# Patient Record
Sex: FEMALE | Race: WHITE | Employment: FULL TIME | URBAN - METROPOLITAN AREA
[De-identification: names, ages, dates, MRNs, and addresses within clinical notes are randomized per-mention and may not be internally consistent; named-entity substitution may affect disease eponyms.]

---

## 2017-02-21 ENCOUNTER — OFFICE VISIT (OUTPATIENT)
Dept: INTERNAL MEDICINE CLINIC | Age: 37
End: 2017-02-21

## 2017-02-21 VITALS
SYSTOLIC BLOOD PRESSURE: 113 MMHG | DIASTOLIC BLOOD PRESSURE: 69 MMHG | TEMPERATURE: 98.2 F | HEIGHT: 62 IN | BODY MASS INDEX: 23.74 KG/M2 | WEIGHT: 129 LBS | OXYGEN SATURATION: 99 % | HEART RATE: 81 BPM | RESPIRATION RATE: 16 BRPM

## 2017-02-21 DIAGNOSIS — R05.9 COUGH: Primary | ICD-10-CM

## 2017-02-21 DIAGNOSIS — R52 BODY ACHES: ICD-10-CM

## 2017-02-21 DIAGNOSIS — J34.89 FRONTAL SINUS PAIN: ICD-10-CM

## 2017-02-21 RX ORDER — NORETHINDRONE ACETATE AND ETHINYL ESTRADIOL AND FERROUS FUMARATE 1MG-20(21)
KIT ORAL
Refills: 0 | COMMUNITY
Start: 2017-02-11

## 2017-02-21 RX ORDER — AMOXICILLIN AND CLAVULANATE POTASSIUM 875; 125 MG/1; MG/1
1 TABLET, FILM COATED ORAL EVERY 12 HOURS
Qty: 10 TAB | Refills: 0 | Status: SHIPPED | OUTPATIENT
Start: 2017-02-21 | End: 2017-02-26

## 2017-02-21 RX ORDER — CODEINE PHOSPHATE AND GUAIFENESIN 10; 100 MG/5ML; MG/5ML
5 SOLUTION ORAL
Qty: 118 ML | Refills: 0 | Status: SHIPPED | OUTPATIENT
Start: 2017-02-21 | End: 2017-02-28

## 2017-02-21 NOTE — PROGRESS NOTES
HISTORY OF PRESENT ILLNESS  Tawanda Mcmahan is a 43 y.o. female. Cold Symptoms   The history is provided by the patient. This is a new problem. The current episode started more than 1 week ago. The problem occurs constantly. The problem has been gradually worsening. The cough is productive of sputum. There has been a fever of 100 - 100.9 F. The fever has been present for 1 - 2 days. Associated symptoms include chills, ear congestion, ear pain, headaches, rhinorrhea and sore throat. Pertinent negatives include no chest pain, no myalgias, no wheezing, no nausea and no vomiting. She has tried cough syrup for the symptoms. The treatment provided no relief. She is not a smoker. Her past medical history does not include bronchitis or pneumonia. Review of Systems   Constitutional: Positive for chills and malaise/fatigue. Negative for fever. HENT: Positive for congestion, ear pain, rhinorrhea and sore throat. Eyes: Negative for blurred vision and double vision. Respiratory: Positive for cough and sputum production. Negative for wheezing. Cardiovascular: Negative for chest pain and palpitations. Gastrointestinal: Negative for nausea and vomiting. Genitourinary: Negative for dysuria, frequency and urgency. Musculoskeletal: Negative for myalgias. Neurological: Positive for headaches. Negative for dizziness and tingling. Psychiatric/Behavioral: Negative for depression. The patient is not nervous/anxious. Physical Exam   Constitutional: She is oriented to person, place, and time. She appears well-developed and well-nourished. No distress. HENT:   Head: Normocephalic and atraumatic. Right Ear: There is tenderness. Tympanic membrane is erythematous. Left Ear: There is tenderness. Tympanic membrane is erythematous. Nose: Mucosal edema and rhinorrhea present. Right sinus exhibits maxillary sinus tenderness and frontal sinus tenderness.  Left sinus exhibits maxillary sinus tenderness and frontal sinus tenderness. Mouth/Throat: Mucous membranes are normal. Oropharyngeal exudate, posterior oropharyngeal edema, posterior oropharyngeal erythema and tonsillar abscesses present. Eyes: EOM are normal. Pupils are equal, round, and reactive to light. Neck: Neck supple. No thyromegaly present. Cardiovascular: Regular rhythm. Pulmonary/Chest: Breath sounds normal. She has no wheezes. Lymphadenopathy:     She has no cervical adenopathy. Neurological: She is alert and oriented to person, place, and time. No cranial nerve deficit. Skin: Skin is warm and dry. She is not diaphoretic. No erythema. Psychiatric: She has a normal mood and affect. Nursing note and vitals reviewed. ASSESSMENT and PLAN    ICD-10-CM ICD-9-CM    1. Cough R05 786.2 amoxicillin-clavulanate (AUGMENTIN) 875-125 mg per tablet      guaiFENesin-codeine (CHERATUSSIN AC) 100-10 mg/5 mL solution   2. Frontal sinus pain J34.89 478.19 amoxicillin-clavulanate (AUGMENTIN) 875-125 mg per tablet   3. Body aches R52 780.96 amoxicillin-clavulanate (AUGMENTIN) 875-125 mg per tablet   Patient advised to take medication as prescribed. Take rest and plenty of fluids. Alternate tylenol and ibuprofen for fever. Return to clinic if condition worsens in next 72 hours. Patient verbalized the understanding.

## 2017-02-21 NOTE — PATIENT INSTRUCTIONS
Cough: Care Instructions  Your Care Instructions  A cough is your body's response to something that bothers your throat or airways. Many things can cause a cough. You might cough because of a cold or the flu, bronchitis, or asthma. Smoking, postnasal drip, allergies, and stomach acid that backs up into your throat also can cause coughs. A cough is a symptom, not a disease. Most coughs stop when the cause, such as a cold, goes away. You can take a few steps at home to cough less and feel better. Follow-up care is a key part of your treatment and safety. Be sure to make and go to all appointments, and call your doctor if you are having problems. It's also a good idea to know your test results and keep a list of the medicines you take. How can you care for yourself at home? · Drink lots of water and other fluids. This helps thin the mucus and soothes a dry or sore throat. Honey or lemon juice in hot water or tea may ease a dry cough. · Take cough medicine as directed by your doctor. · Prop up your head on pillows to help you breathe and ease a dry cough. · Try cough drops to soothe a dry or sore throat. Cough drops don't stop a cough. Medicine-flavored cough drops are no better than candy-flavored drops or hard candy. · Do not smoke. Avoid secondhand smoke. If you need help quitting, talk to your doctor about stop-smoking programs and medicines. These can increase your chances of quitting for good. When should you call for help? Call 911 anytime you think you may need emergency care. For example, call if:  · You have severe trouble breathing. Call your doctor now or seek immediate medical care if:  · You cough up blood. · You have new or worse trouble breathing. · You have a new or higher fever. · You have a new rash.   Watch closely for changes in your health, and be sure to contact your doctor if:  · You cough more deeply or more often, especially if you notice more mucus or a change in the color of your mucus. · You have new symptoms, such as a sore throat, an earache, or sinus pain. · You do not get better as expected. Where can you learn more? Go to http://lebron-hillary.info/. Enter D279 in the search box to learn more about \"Cough: Care Instructions. \"  Current as of: May 27, 2016  Content Version: 11.1  © 0979-0169 [employer]. Care instructions adapted under license by [employer] (which disclaims liability or warranty for this information). If you have questions about a medical condition or this instruction, always ask your healthcare professional. [address] any warranty or liability for your use of this information.

## 2017-02-21 NOTE — MR AVS SNAPSHOT
Visit Information Date & Time Provider Department Dept. Phone Encounter #  
 2/21/2017  9:00 AM Earnest Kruger NP [facility] [phone] [id number] Upcoming Health Maintenance Date Due INFLUENZA AGE 9 TO ADULT 8/1/2016 PAP AKA CERVICAL CYTOLOGY 2/4/2017 DTaP/Tdap/Td series (2 - Td) 9/18/2025 Allergies as of 2/21/2017  Review Complete On: 2/21/2017 By: Jose Alberto Ballesteros LPN Severity Noted Reaction Type Reactions Macrobid [Nitrofurantoin Monohyd/m-cryst]  09/07/2012   Intolerance Shortness of Breath Current Immunizations  Reviewed on 10/2/2015 Name Date DTaP 9/18/2015 Influenza Vaccine (Quad) PF 10/1/2015 11:36 AM  
 MMR 10/1/2015 12:55 PM  
  
 Not reviewed this visit You Were Diagnosed With   
  
 Codes Comments Cough    -  Primary ICD-10-CM: G62 ICD-9-CM: 786.2 Frontal sinus pain     ICD-10-CM: J34.89 ICD-9-CM: 478.19 Body aches     ICD-10-CM: R52 ICD-9-CM: 780.96 Vitals BP Pulse Temp Resp Height(growth percentile) Weight(growth percentile) 113/69 (BP 1 Location: Right arm, BP Patient Position: Sitting) 81 98.2 °F (36.8 °C) (Oral) 16 5' 2\" (1.575 m) 129 lb (58.5 kg) LMP SpO2 BMI OB Status Smoking Status 01/07/2017 (Approximate) 99% 23.59 kg/m2 Recent pregnancy Never Smoker Vitals History BMI and BSA Data Body Mass Index Body Surface Area  
 23.59 kg/m 2 1.6 m 2 Preferred Pharmacy Pharmacy Name Phone [facility] [address] [phone] Your Updated Medication List  
  
   
This list is accurate as of: 2/21/17  9:24 AM.  Always use your most recent med list.  
  
  
  
  
 amoxicillin-clavulanate 875-125 mg per tablet Commonly known as:  AUGMENTIN Take 1 Tab by mouth every twelve (12) hours for 5 days. guaiFENesin-codeine 100-10 mg/5 mL solution Commonly known as:  [place]  
 Take 5 mL by mouth three (3) times daily as needed for Cough for up to 7 days. Max Daily Amount: 15 mL. ibuprofen 800 mg tablet Commonly known as:  MOTRIN Take 1 Tab by mouth every six (6) hours as needed. JUNEL FE 1/20 (28) 1 mg-20 mcg (21)/75 mg (7) Tab Generic drug:  norethindrone-ethinyl estradiol PNV with Ca No.65-Iron Poly-FA 60 mg iron-1 mg Cap Take  by mouth. ZOLOFT 100 mg tablet Generic drug:  sertraline Take 200 mg by mouth daily. Prescriptions Printed Refills  
 guaiFENesin-codeine (CHERATUSSIN AC) 100-10 mg/5 mL solution 0 Sig: Take 5 mL by mouth three (3) times daily as needed for Cough for up to 7 days. Max Daily Amount: 15 mL. Class: Print Route: Oral  
  
Prescriptions Sent to Pharmacy Refills  
 amoxicillin-clavulanate (AUGMENTIN) 875-125 mg per tablet 0 Sig: Take 1 Tab by mouth every twelve (12) hours for 5 days. Class: Normal  
 Pharmacy: [id number] [address] [place] [address] Ph #: [phone] Route: Oral  
  
Patient Instructions Cough: Care Instructions Your Care Instructions A cough is your body's response to something that bothers your throat or airways. Many things can cause a cough. You might cough because of a cold or the flu, bronchitis, or asthma. Smoking, postnasal drip, allergies, and stomach acid that backs up into your throat also can cause coughs. A cough is a symptom, not a disease. Most coughs stop when the cause, such as a cold, goes away. You can take a few steps at home to cough less and feel better. Follow-up care is a key part of your treatment and safety. Be sure to make and go to all appointments, and call your doctor if you are having problems. It's also a good idea to know your test results and keep a list of the medicines you take. How can you care for yourself at home? · Drink lots of water and other fluids.  This helps thin the mucus and soothes a dry or sore throat. Honey or lemon juice in hot water or tea may ease a dry cough. · Take cough medicine as directed by your doctor. · Prop up your head on pillows to help you breathe and ease a dry cough. · Try cough drops to soothe a dry or sore throat. Cough drops don't stop a cough. Medicine-flavored cough drops are no better than candy-flavored drops or hard candy. · Do not smoke. Avoid secondhand smoke. If you need help quitting, talk to your doctor about stop-smoking programs and medicines. These can increase your chances of quitting for good. When should you call for help? Call 911 anytime you think you may need emergency care. For example, call if: 
· You have severe trouble breathing. Call your doctor now or seek immediate medical care if: 
· You cough up blood. · You have new or worse trouble breathing. · You have a new or higher fever. · You have a new rash. Watch closely for changes in your health, and be sure to contact your doctor if: 
· You cough more deeply or more often, especially if you notice more mucus or a change in the color of your mucus. · You have new symptoms, such as a sore throat, an earache, or sinus pain. · You do not get better as expected. Where can you learn more? Go to http://lebron-hillary.info/. Enter D279 in the search box to learn more about \"Cough: Care Instructions. \" Current as of: May 27, 2016 Content Version: 11.1 © 1798-2830 [employer]. Care instructions adapted under license by [employer] (which disclaims liability or warranty for this information). If you have questions about a medical condition or this instruction, always ask your healthcare professional. [place] [address] any warranty or liability for your use of this information. Introducing [facility]!    
 Veronica Montgomeyr introduces [employer] patient portal. Now you can access parts of your medical record, email your doctor's office, and request medication refills online. 1. In your internet browser, go to [email or web address] 2. Click on the First Time User? Click Here link in the Sign In box. You will see the New Member Sign Up page. 3. Enter your Casenet Access Code exactly as it appears below. You will not need to use this code after youve completed the sign-up process. If you do not sign up before the expiration date, you must request a new code. · [employer]et Access Code: [email or web address] Expires: 5/22/2017  9:24 AM 
 
4. Enter the last four digits of your Social Security Number (xxxx) and Date of Birth (mm/dd/yyyy) as indicated and click Submit. You will be taken to the next sign-up page. 5. Create a Casenet ID. This will be your Casenet login ID and cannot be changed, so think of one that is secure and easy to remember. 6. Create a Casenet password. You can change your password at any time. 7. Enter your Password Reset Question and Answer. This can be used at a later time if you forget your password. 8. Enter your e-mail address. You will receive e-mail notification when new information is available in [address] 9. Click Sign Up. You can now view and download portions of your medical record. 10. Click the Download Summary menu link to download a portable copy of your medical information. If you have questions, please visit the Frequently Asked Questions section of the [employer]et website. Remember, [employer]et is NOT to be used for urgent needs. For medical emergencies, dial 911. Now available from your iPhone and Android! Please provide this summary of care documentation to your next provider. Your primary care clinician is listed as Luther Warner. If you have any questions after today's visit, please call [phone].

## 2017-02-21 NOTE — PROGRESS NOTES
Pt presented today with cold symptoms x 3 days. Has pt had any falls since last visit? No.  Pt preferred language for health care discussion is english. Advanced Directive? No    Is someone accompanying this pt? No    Is the patient using any DME equipment during OV? No      1. Have you been to the ER, urgent care clinic since your last visit? Hospitalized since your last visit? No    2. Have you seen or consulted any other health care providers outside of the [address] [place] [address] since your last visit? Include any pap smears or colon screening. No      Pt has a reminder for a \"due or due soon\" health maintenance. I have asked that she contact his primary care provider for follow-up on this health maintenance.

## 2017-03-21 ENCOUNTER — OFFICE VISIT (OUTPATIENT)
Dept: INTERNAL MEDICINE CLINIC | Age: 37
End: 2017-03-21

## 2017-03-21 VITALS
WEIGHT: 129 LBS | HEART RATE: 76 BPM | BODY MASS INDEX: 23.74 KG/M2 | DIASTOLIC BLOOD PRESSURE: 69 MMHG | TEMPERATURE: 99.2 F | HEIGHT: 62 IN | OXYGEN SATURATION: 99 % | SYSTOLIC BLOOD PRESSURE: 116 MMHG | RESPIRATION RATE: 16 BRPM

## 2017-03-21 DIAGNOSIS — R52 BODY ACHES: Primary | ICD-10-CM

## 2017-03-21 DIAGNOSIS — R50.9 FEVER, UNSPECIFIED FEVER CAUSE: ICD-10-CM

## 2017-03-21 DIAGNOSIS — R05.9 COUGH: ICD-10-CM

## 2017-03-21 RX ORDER — LORATADINE 10 MG/1
10 TABLET ORAL DAILY
Qty: 30 TAB | Refills: 0 | Status: SHIPPED | OUTPATIENT
Start: 2017-03-21 | End: 2017-04-20

## 2017-03-21 RX ORDER — OSELTAMIVIR PHOSPHATE 75 MG/1
75 CAPSULE ORAL 2 TIMES DAILY
Qty: 10 CAP | Refills: 0 | Status: SHIPPED | OUTPATIENT
Start: 2017-03-21 | End: 2017-03-26

## 2017-03-21 RX ORDER — FLUCONAZOLE 150 MG/1
TABLET ORAL
Refills: 0 | COMMUNITY
Start: 2017-03-10 | End: 2018-03-22 | Stop reason: ALTCHOICE

## 2017-03-21 NOTE — PROGRESS NOTES
HISTORY OF PRESENT ILLNESS  Donald Jerome is a 43 y.o. female. Generalized Body Aches   The history is provided by the patient. This is a new problem. The current episode started more than 2 days ago. The problem occurs constantly. The problem has been gradually worsening. Associated symptoms include headaches. Pertinent negatives include no chest pain, no abdominal pain and no shortness of breath. Associated symptoms comments: Fever, maliase, fatigue. Nothing aggravates the symptoms. Nothing relieves the symptoms. She has tried nothing for the symptoms. The treatment provided no relief. Review of Systems   Constitutional: Positive for chills, fever and malaise/fatigue. HENT: Positive for congestion. Negative for ear pain and sore throat. Eyes: Negative for blurred vision, double vision and photophobia. Respiratory: Positive for cough and sputum production. Negative for shortness of breath and wheezing. Cardiovascular: Negative for chest pain and palpitations. Gastrointestinal: Negative for abdominal pain, heartburn, nausea and vomiting. Genitourinary: Negative for dysuria and urgency. Musculoskeletal: Positive for myalgias and neck pain. Negative for back pain, falls and joint pain. Skin: Negative for rash. Neurological: Positive for weakness and headaches. Negative for dizziness and tingling. Endo/Heme/Allergies: Negative for environmental allergies and polydipsia. Psychiatric/Behavioral: The patient is not nervous/anxious. Physical Exam   Constitutional: She is oriented to person, place, and time. She appears well-developed and well-nourished. No distress. HENT:   Head: Normocephalic and atraumatic. Right Ear: External ear normal. No middle ear effusion. Left Ear:  No middle ear effusion. Mouth/Throat: No oropharyngeal exudate. Eyes: EOM are normal. Pupils are equal, round, and reactive to light. Neck: Neck supple. No thyromegaly present.    Cardiovascular: Regular rhythm. Pulmonary/Chest: Breath sounds normal. No respiratory distress. She has no wheezes. Lymphadenopathy:     She has no cervical adenopathy. Neurological: She is alert and oriented to person, place, and time. No cranial nerve deficit. Skin: She is not diaphoretic. Psychiatric: She has a normal mood and affect. Nursing note and vitals reviewed. ASSESSMENT and PLAN    ICD-10-CM ICD-9-CM    1. Body aches R52 780.96 oseltamivir (TAMIFLU) 75 mg capsule   2. Cough R05 786.2 loratadine (CLARITIN) 10 mg tablet   3. Fever, unspecified fever cause R50.9 780.60 oseltamivir (TAMIFLU) 75 mg capsule   Patient advised to take medication as prescribed. Take rest and plenty of fluids. Alternate tylenol and ibuprofen for fever. Return to clinic if condition worsens in next 72 hours.

## 2017-03-21 NOTE — MR AVS SNAPSHOT
Visit Information Date & Time Provider Department Dept. Phone Encounter #  
 3/21/2017  4:00 PM [facility] NP Shira Spears 139 [id number] Upcoming Health Maintenance Date Due INFLUENZA AGE 9 TO ADULT 8/1/2016 PAP AKA CERVICAL CYTOLOGY 2/4/2017 DTaP/Tdap/Td series (2 - Td) 9/18/2025 Allergies as of 3/21/2017  Review Complete On: 3/21/2017 By: Viktoriya Theodore LPN Severity Noted Reaction Type Reactions Macrobid [Nitrofurantoin Monohyd/m-cryst]  09/07/2012   Intolerance Shortness of Breath Current Immunizations  Reviewed on 10/2/2015 Name Date DTaP 9/18/2015 Influenza Vaccine (Quad) PF 10/1/2015 11:36 AM  
 MMR 10/1/2015 12:55 PM  
  
 Not reviewed this visit You Were Diagnosed With   
  
 Codes Comments Body aches    -  Primary ICD-10-CM: D27 ICD-9-CM: 780.96 Cough     ICD-10-CM: R05 ICD-9-CM: 786.2 Fever, unspecified fever cause     ICD-10-CM: R50.9 ICD-9-CM: 780.60 Vitals BP Pulse Temp Resp Height(growth percentile) Weight(growth percentile) 116/69 (BP 1 Location: Right arm, BP Patient Position: Sitting) 76 99.2 °F (37.3 °C) (Oral) 16 5' 2\" (1.575 m) 129 lb (58.5 kg) LMP SpO2 Breastfeeding? BMI OB Status Smoking Status 02/07/2017 (Exact Date) 99% No 23.59 kg/m2 Recent pregnancy Never Smoker Vitals History BMI and BSA Data Body Mass Index Body Surface Area  
 23.59 kg/m 2 1.6 m 2 Preferred Pharmacy Pharmacy Name Phone [facility], [address] [place] [address] Drive [phone] Your Updated Medication List  
  
   
This list is accurate as of: 3/21/17  4:29 PM.  Always use your most recent med list.  
  
  
  
  
 fluconazole 150 mg tablet Commonly known as:  DIFLUCAN  
take 1 tablet by mouth REPEAT IN 72 HOURS IF NEEDED  
  
 ibuprofen 800 mg tablet Commonly known as:  MOTRIN Take 1 Tab by mouth every six (6) hours as needed. JUNEL FE 1/20 (28) 1 mg-20 mcg (21)/75 mg (7) Tab Generic drug:  norethindrone-ethinyl estradiol  
  
 loratadine 10 mg tablet Commonly known as:  Kathlyne Old Take 1 Tab by mouth daily for 30 days. oseltamivir 75 mg capsule Commonly known as:  TAMIFLU Take 1 Cap by mouth two (2) times a day for 5 days. PNV with Ca No.65-Iron Poly-FA 60 mg iron-1 mg Cap Take  by mouth. ZOLOFT 100 mg tablet Generic drug:  sertraline Take 200 mg by mouth daily. Prescriptions Sent to Pharmacy Refills  
 oseltamivir (TAMIFLU) 75 mg capsule 0 Sig: Take 1 Cap by mouth two (2) times a day for 5 days. Class: Normal  
 Pharmacy: [id number] [address] [place] [address] Ph #: [phone] Route: Oral  
 loratadine (CLARITIN) 10 mg tablet 0 Sig: Take 1 Tab by mouth daily for 30 days. Class: Normal  
 Pharmacy: [id number] [address] [place] [address] Ph #: [phone] Route: Oral  
  
Patient Instructions Cough: Care Instructions Your Care Instructions A cough is your body's response to something that bothers your throat or airways. Many things can cause a cough. You might cough because of a cold or the flu, bronchitis, or asthma. Smoking, postnasal drip, allergies, and stomach acid that backs up into your throat also can cause coughs. A cough is a symptom, not a disease. Most coughs stop when the cause, such as a cold, goes away. You can take a few steps at home to cough less and feel better. Follow-up care is a key part of your treatment and safety. Be sure to make and go to all appointments, and call your doctor if you are having problems. It's also a good idea to know your test results and keep a list of the medicines you take. How can you care for yourself at home? · Drink lots of water and other fluids.  This helps thin the mucus and soothes a dry or sore throat. Honey or lemon juice in hot water or tea may ease a dry cough. · Take cough medicine as directed by your doctor. · Prop up your head on pillows to help you breathe and ease a dry cough. · Try cough drops to soothe a dry or sore throat. Cough drops don't stop a cough. Medicine-flavored cough drops are no better than candy-flavored drops or hard candy. · Do not smoke. Avoid secondhand smoke. If you need help quitting, talk to your doctor about stop-smoking programs and medicines. These can increase your chances of quitting for good. When should you call for help? Call 911 anytime you think you may need emergency care. For example, call if: 
· You have severe trouble breathing. Call your doctor now or seek immediate medical care if: 
· You cough up blood. · You have new or worse trouble breathing. · You have a new or higher fever. · You have a new rash. Watch closely for changes in your health, and be sure to contact your doctor if: 
· You cough more deeply or more often, especially if you notice more mucus or a change in the color of your mucus. · You have new symptoms, such as a sore throat, an earache, or sinus pain. · You do not get better as expected. Where can you learn more? Go to http://lebron-hillary.info/. Enter D279 in the search box to learn more about \"Cough: Care Instructions. \" Current as of: May 27, 2016 Content Version: 11.1 © 3795-9152 [employer]. Care instructions adapted under license by [employer] (which disclaims liability or warranty for this information). If you have questions about a medical condition or this instruction, always ask your healthcare professional. [place] [address] any warranty or liability for your use of this information. Introducing [facility]!    
 [employer] introduces [employer] patient portal. Now you can access parts of your medical record, email your doctor's office, and request medication refills online. 1. In your internet browser, go to [email or web address] 2. Click on the First Time User? Click Here link in the Sign In box. You will see the New Member Sign Up page. 3. Enter your [employer] Access Code exactly as it appears below. You will not need to use this code after youve completed the sign-up process. If you do not sign up before the expiration date, you must request a new code. · [employer] Access Code: [email or web address] Expires: 5/22/2017 10:24 AM 
 
4. Enter the last four digits of your Social Security Number (xxxx) and Date of Birth (mm/dd/yyyy) as indicated and click Submit. You will be taken to the next sign-up page. 5. Create a [employer] ID. This will be your [employer] login ID and cannot be changed, so think of one that is secure and easy to remember. 6. Create a [employer] password. You can change your password at any time. 7. Enter your Password Reset Question and Answer. This can be used at a later time if you forget your password. 8. Enter your e-mail address. You will receive e-mail notification when new information is available in [address] 9. Click Sign Up. You can now view and download portions of your medical record. 10. Click the Download Summary menu link to download a portable copy of your medical information. If you have questions, please visit the Frequently Asked Questions section of the [employer] website. Remember, [employer] is NOT to be used for urgent needs. For medical emergencies, dial 911. Now available from your iPhone and Android! Please provide this summary of care documentation to your next provider. Your primary care clinician is listed as Judy Morales. If you have any questions after today's visit, please call [phone].

## 2017-03-21 NOTE — PATIENT INSTRUCTIONS
Cough: Care Instructions  Your Care Instructions  A cough is your body's response to something that bothers your throat or airways. Many things can cause a cough. You might cough because of a cold or the flu, bronchitis, or asthma. Smoking, postnasal drip, allergies, and stomach acid that backs up into your throat also can cause coughs. A cough is a symptom, not a disease. Most coughs stop when the cause, such as a cold, goes away. You can take a few steps at home to cough less and feel better. Follow-up care is a key part of your treatment and safety. Be sure to make and go to all appointments, and call your doctor if you are having problems. It's also a good idea to know your test results and keep a list of the medicines you take. How can you care for yourself at home? · Drink lots of water and other fluids. This helps thin the mucus and soothes a dry or sore throat. Honey or lemon juice in hot water or tea may ease a dry cough. · Take cough medicine as directed by your doctor. · Prop up your head on pillows to help you breathe and ease a dry cough. · Try cough drops to soothe a dry or sore throat. Cough drops don't stop a cough. Medicine-flavored cough drops are no better than candy-flavored drops or hard candy. · Do not smoke. Avoid secondhand smoke. If you need help quitting, talk to your doctor about stop-smoking programs and medicines. These can increase your chances of quitting for good. When should you call for help? Call 911 anytime you think you may need emergency care. For example, call if:  · You have severe trouble breathing. Call your doctor now or seek immediate medical care if:  · You cough up blood. · You have new or worse trouble breathing. · You have a new or higher fever. · You have a new rash.   Watch closely for changes in your health, and be sure to contact your doctor if:  · You cough more deeply or more often, especially if you notice more mucus or a change in the color of your mucus. · You have new symptoms, such as a sore throat, an earache, or sinus pain. · You do not get better as expected. Where can you learn more? Go to http://lebron-hillary.info/. Enter D279 in the search box to learn more about \"Cough: Care Instructions. \"  Current as of: May 27, 2016  Content Version: 11.1  © 3882-8485 [employer]. Care instructions adapted under license by [employer] (which disclaims liability or warranty for this information). If you have questions about a medical condition or this instruction, always ask your healthcare professional. [address] any warranty or liability for your use of this information.

## 2017-03-21 NOTE — PROGRESS NOTES
Pt presented today with sinus congestion with dark brown and green mucus, sinus pain x 1 week. Has pt had any falls since last visit? No.  Pt preferred language for health care discussion is english. Advanced Directive? No    Is someone accompanying this pt? No    Is the patient using any DME equipment during OV? No      1. Have you been to the ER, urgent care clinic since your last visit? Hospitalized since your last visit? No    2. Have you seen or consulted any other health care providers outside of the [address] [place] [address] since your last visit? Include any pap smears or colon screening. No      Pt has a reminder for a \"due or due soon\" health maintenance. I have asked that she contact his primary care provider for follow-up on this health maintenance.

## 2017-03-29 ENCOUNTER — HOSPITAL ENCOUNTER (OUTPATIENT)
Dept: LAB | Age: 37
Discharge: HOME | End: 2017-03-29
Payer: OTHER GOVERNMENT

## 2017-03-29 PROCEDURE — 88175 CYTOPATH C/V AUTO FLUID REDO: CPT | Performed by: OBSTETRICS & GYNECOLOGY

## 2017-03-29 PROCEDURE — 87624 HPV HI-RISK TYP POOLED RSLT: CPT | Performed by: OBSTETRICS & GYNECOLOGY

## 2018-02-12 ENCOUNTER — OFFICE VISIT (OUTPATIENT)
Dept: INTERNAL MEDICINE CLINIC | Age: 38
End: 2018-02-12

## 2018-02-12 VITALS
WEIGHT: 129 LBS | SYSTOLIC BLOOD PRESSURE: 95 MMHG | BODY MASS INDEX: 23.74 KG/M2 | TEMPERATURE: 98.3 F | HEIGHT: 62 IN | RESPIRATION RATE: 16 BRPM | OXYGEN SATURATION: 98 % | DIASTOLIC BLOOD PRESSURE: 63 MMHG | HEART RATE: 92 BPM

## 2018-02-12 DIAGNOSIS — J40 BRONCHITIS: ICD-10-CM

## 2018-02-12 DIAGNOSIS — R05.9 COUGH: Primary | ICD-10-CM

## 2018-02-12 DIAGNOSIS — J06.9 ACUTE URI: ICD-10-CM

## 2018-02-12 LAB
QUICKVUE INFLUENZA TEST: NEGATIVE
VALID INTERNAL CONTROL?: YES

## 2018-02-12 RX ORDER — HYDROCODONE POLISTIREX AND CHLORPHENIRAMINE POLISTIREX 10; 8 MG/5ML; MG/5ML
1 SUSPENSION, EXTENDED RELEASE ORAL
Qty: 115 ML | Refills: 0 | Status: SHIPPED | OUTPATIENT
Start: 2018-02-12 | End: 2018-02-12

## 2018-02-12 RX ORDER — CETIRIZINE HYDROCHLORIDE, PSEUDOEPHEDRINE HYDROCHLORIDE 5; 120 MG/1; MG/1
1 TABLET, FILM COATED, EXTENDED RELEASE ORAL 2 TIMES DAILY
Qty: 30 TAB | Refills: 0 | Status: SHIPPED | OUTPATIENT
Start: 2018-02-12 | End: 2021-12-10

## 2018-02-12 NOTE — PROGRESS NOTES
HISTORY OF PRESENT ILLNESS  Afsaneh Birmingham is a 42 y.o. female. Patient presents with productive cough, body aches, HA, sore throat, fatigue x 3 days. Patient has been taking Tylenol with some relief. Denies being exposed to anyone sick. Patient denies any fever,chills, NVD,abd pain,dizziness, SOB,CP. Cold Symptoms   The history is provided by the patient. This is a new problem. The current episode started more than 2 days ago. The problem occurs constantly. The cough is productive of sputum. There has been no fever. Associated symptoms include headaches and rhinorrhea. Pertinent negatives include no chest pain, no chills, no sweats, no weight loss, no myalgias, no shortness of breath, no wheezing, no nausea, no vomiting and no confusion. Review of Systems   Constitutional: Negative. Negative for chills and weight loss. HENT: Positive for rhinorrhea. Respiratory: Positive for cough and sputum production. Negative for shortness of breath and wheezing. Cardiovascular: Negative. Negative for chest pain. Gastrointestinal: Negative for nausea and vomiting. Musculoskeletal: Negative. Negative for myalgias. Skin: Negative. Neurological: Positive for headaches. Psychiatric/Behavioral: Negative. Negative for confusion. Physical Exam   Constitutional: She is oriented to person, place, and time. She appears well-developed and well-nourished. BP 95/63 (BP 1 Location: Left arm, BP Patient Position: Sitting)  Pulse 92  Temp 98.3 °F (36.8 °C) (Oral)   Resp 16  Ht 5' 2\" (1.575 m)  Wt 129 lb (58.5 kg)  LMP 02/05/2018 (Approximate)  SpO2 98%  Breastfeeding? No  BMI 23.59 kg/m2     HENT:   Head: Normocephalic and atraumatic. Eyes: Conjunctivae and EOM are normal. Pupils are equal, round, and reactive to light. Neck: Normal range of motion. Cardiovascular: Normal rate. Pulmonary/Chest: Effort normal and breath sounds normal.   Musculoskeletal: Normal range of motion. Neurological: She is alert and oriented to person, place, and time. GCS eye subscore is 4. GCS verbal subscore is 5. GCS motor subscore is 6. Skin: Skin is warm and dry. Psychiatric: She has a normal mood and affect. Her behavior is normal. Thought content normal.   Vitals reviewed. ASSESSMENT and PLAN    ICD-10-CM ICD-9-CM    1. Cough R05 786.2 AMB POC RAPID INFLUENZA TEST      cetirizine-psuedoePHEDrine (ZYRTEC-D) 5-120 mg per tablet      DISCONTINUED: chlorpheniramine-HYDROcodone (TUSSIONEX) 10-8 mg/5 mL suspension   2. Bronchitis J40 490 cetirizine-psuedoePHEDrine (ZYRTEC-D) 5-120 mg per tablet      DISCONTINUED: chlorpheniramine-HYDROcodone (TUSSIONEX) 10-8 mg/5 mL suspension   3. Acute URI J06.9 465.9 cetirizine-psuedoePHEDrine (ZYRTEC-D) 5-120 mg per tablet      DISCONTINUED: chlorpheniramine-HYDROcodone (TUSSIONEX) 10-8 mg/5 mL suspension     Encounter Diagnoses   Name Primary?  Cough Yes    Bronchitis     Acute URI      Orders Placed This Encounter    AMB POC RAPID INFLUENZA TEST    DISCONTD: chlorpheniramine-HYDROcodone (TUSSIONEX) 10-8 mg/5 mL suspension    cetirizine-psuedoePHEDrine (ZYRTEC-D) 5-120 mg per tablet     Orders Placed This Encounter    AMB POC RAPID INFLUENZA TEST    DISCONTD: chlorpheniramine-HYDROcodone (TUSSIONEX) 10-8 mg/5 mL suspension     Sig: Take 5 mL by mouth every twelve (12) hours as needed for Cough. Max Daily Amount: 10 mL. Dispense:  115 mL     Refill:  0    cetirizine-psuedoePHEDrine (ZYRTEC-D) 5-120 mg per tablet     Sig: Take 1 Tab by mouth two (2) times a day. Dispense:  30 Tab     Refill:  0     Orders Placed This Encounter    AMB POC RAPID INFLUENZA TEST    DISCONTD: chlorpheniramine-HYDROcodone (TUSSIONEX) 10-8 mg/5 mL suspension    cetirizine-psuedoePHEDrine (ZYRTEC-D) 5-120 mg per tablet     Diagnoses and all orders for this visit:    1.  Cough  -     AMB POC RAPID INFLUENZA TEST  -     cetirizine-psuedoePHEDrine (ZYRTEC-D) 5-120 mg per tablet; Take 1 Tab by mouth two (2) times a day. 2. Bronchitis  -     cetirizine-psuedoePHEDrine (ZYRTEC-D) 5-120 mg per tablet; Take 1 Tab by mouth two (2) times a day. 3. Acute URI  -     cetirizine-psuedoePHEDrine (ZYRTEC-D) 5-120 mg per tablet; Take 1 Tab by mouth two (2) times a day. Follow-up Disposition:  Return if symptoms worsen or fail to improve. current treatment plan is effective, no change in therapy  the following changes in treatment are made: Patient advised to take medication as prescribed. Take rest and plenty of fluids. Alternate tylenol and ibuprofen for fever. Call  if condition worsens in next 48-72 hours. AVS instruction printed and provided to the patient. General instruction given regarding plan of care. Patient verbalized the understanding of all information.

## 2018-02-12 NOTE — PROGRESS NOTES
ROOM #     Cloteal Shingles presents today for   Chief Complaint   Patient presents with    Cold Symptoms     fatigue, coughing dark yellow phlegm, nasal drainage light yellow in color x 3 days       Cloteal Shingles preferred language for health care discussion is english/other. Is someone accompanying this pt? No    Is the patient using any DME equipment during OV? No    Depression Screening:  PHQ over the last two weeks 7/19/2014   Little interest or pleasure in doing things Several days   Feeling down, depressed or hopeless Several days   Total Score PHQ 2 2       Learning Assessment:  Learning Assessment 10/24/2014 7/19/2014   PRIMARY LEARNER Patient Patient   HIGHEST LEVEL OF EDUCATION - PRIMARY LEARNER  - > 4 YEARS OF COLLEGE   BARRIERS PRIMARY LEARNER - NONE   PRIMARY LANGUAGE ENGLISH ENGLISH   LEARNER PREFERENCE PRIMARY DEMONSTRATION VIDEOS   ANSWERED BY PATIENT patient   RELATIONSHIP SELF SELF       Abuse Screening:  Completed     Fall Risk  No flowsheet data found. Health Maintenance reviewed and discussed per provider. Yes; none due. Advance Directive:  1. Do you have an advance directive in place? Patient Reply:  Yes    2. If not, would you like material regarding how to put one in place? Patient Reply: No    Coordination of Care:  1. Have you been to the ER, urgent care clinic since your last visit? Hospitalized since your last visit? No     2. Have you seen or consulted any other health care providers outside of the [address] [place] [address] since your last visit?   Yes;  [facility]

## 2018-02-12 NOTE — ACP (ADVANCE CARE PLANNING)
Advance Directive:  1. Do you have an advance directive in place? Patient Reply:  Yes    2. If not, would you like material regarding how to put one in place?  Patient Reply: No

## 2018-02-12 NOTE — PATIENT INSTRUCTIONS
Bronchitis: Care Instructions  Your Care Instructions    Bronchitis is inflammation of the bronchial tubes, which carry air to the lungs. The tubes swell and produce mucus, or phlegm. The mucus and inflamed bronchial tubes make you cough. You may have trouble breathing. Most cases of bronchitis are caused by viruses like those that cause colds. Antibiotics usually do not help and they may be harmful. Bronchitis usually develops rapidly and lasts about 2 to 3 weeks in otherwise healthy people. Follow-up care is a key part of your treatment and safety. Be sure to make and go to all appointments, and call your doctor if you are having problems. It's also a good idea to know your test results and keep a list of the medicines you take. How can you care for yourself at home? · Take all medicines exactly as prescribed. Call your doctor if you think you are having a problem with your medicine. · Get some extra rest.  · Take an over-the-counter pain medicine, such as acetaminophen (Tylenol), ibuprofen (Advil, Motrin), or naproxen (Aleve) to reduce fever and relieve body aches. Read and follow all instructions on the label. · Do not take two or more pain medicines at the same time unless the doctor told you to. Many pain medicines have acetaminophen, which is Tylenol. Too much acetaminophen (Tylenol) can be harmful. · Take an over-the-counter cough medicine that contains dextromethorphan to help quiet a dry, hacking cough so that you can sleep. Avoid cough medicines that have more than one active ingredient. Read and follow all instructions on the label. · Breathe moist air from a humidifier, hot shower, or sink filled with hot water. The heat and moisture will thin mucus so you can cough it out. · Do not smoke. Smoking can make bronchitis worse. If you need help quitting, talk to your doctor about stop-smoking programs and medicines. These can increase your chances of quitting for good.   When should you call for help? Call 911 anytime you think you may need emergency care. For example, call if:  ? · You have severe trouble breathing. ?Call your doctor now or seek immediate medical care if:  ? · You have new or worse trouble breathing. ? · You cough up dark brown or bloody mucus (sputum). ? · You have a new or higher fever. ? · You have a new rash. ? Watch closely for changes in your health, and be sure to contact your doctor if:  ? · You cough more deeply or more often, especially if you notice more mucus or a change in the color of your mucus. ? · You are not getting better as expected. Where can you learn more? Go to http://lebron-hillary.info/. Enter H333 in the search box to learn more about \"Bronchitis: Care Instructions. \"  Current as of: May 12, 2017  Content Version: 11.4  © 6373-8072 [employer]. Care instructions adapted under license by [employer] (which disclaims liability or warranty for this information). If you have questions about a medical condition or this instruction, always ask your healthcare professional. [place] [address] any warranty or liability for your use of this information. Upper Respiratory Infection (Cold): Care Instructions  Your Care Instructions    An upper respiratory infection, or URI, is an infection of the nose, sinuses, or throat. URIs are spread by coughs, sneezes, and direct contact. The common cold is the most frequent kind of URI. The flu and sinus infections are other kinds of URIs. Almost all URIs are caused by viruses. Antibiotics won't cure them. But you can treat most infections with home care. This may include drinking lots of fluids and taking over-the-counter pain medicine. You will probably feel better in 4 to 10 days. The doctor has checked you carefully, but problems can develop later. If you notice any problems or new symptoms, get medical treatment right away.   Follow-up care is a key part of your treatment and safety. Be sure to make and go to all appointments, and call your doctor if you are having problems. It's also a good idea to know your test results and keep a list of the medicines you take. How can you care for yourself at home? · To prevent dehydration, drink plenty of fluids, enough so that your urine is light yellow or clear like water. Choose water and other caffeine-free clear liquids until you feel better. If you have kidney, heart, or liver disease and have to limit fluids, talk with your doctor before you increase the amount of fluids you drink. · Take an over-the-counter pain medicine, such as acetaminophen (Tylenol), ibuprofen (Advil, Motrin), or naproxen (Aleve). Read and follow all instructions on the label. · Before you use cough and cold medicines, check the label. These medicines may not be safe for young children or for people with certain health problems. · Be careful when taking over-the-counter cold or flu medicines and Tylenol at the same time. Many of these medicines have acetaminophen, which is Tylenol. Read the labels to make sure that you are not taking more than the recommended dose. Too much acetaminophen (Tylenol) can be harmful. · Get plenty of rest.  · Do not smoke or allow others to smoke around you. If you need help quitting, talk to your doctor about stop-smoking programs and medicines. These can increase your chances of quitting for good. When should you call for help? Call 911 anytime you think you may need emergency care. For example, call if:  ? · You have severe trouble breathing. ?Call your doctor now or seek immediate medical care if:  ? · You seem to be getting much sicker. ? · You have new or worse trouble breathing. ? · You have a new or higher fever. ? · You have a new rash. ? Watch closely for changes in your health, and be sure to contact your doctor if:  ? · You have a new symptom, such as a sore throat, an earache, or sinus pain. ? · You cough more deeply or more often, especially if you notice more mucus or a change in the color of your mucus. ? · You do not get better as expected. Where can you learn more? Go to http://lebron-hillary.info/. Enter G401 in the search box to learn more about \"Upper Respiratory Infection (Cold): Care Instructions. \"  Current as of: May 12, 2017  Content Version: 11.4  © 8996-4966 [employer]. Care instructions adapted under license by [employer] (which disclaims liability or warranty for this information). If you have questions about a medical condition or this instruction, always ask your healthcare professional. [address] any warranty or liability for your use of this information.

## 2018-02-12 NOTE — PROGRESS NOTES
Patient presents with productive cough, body aches, HA, sore throat, fatigue x 3 days. Patient has been taking Tylenol with some relief. Denies being exposed to anyone sick. Patient denies any fever,chills, NVD,abd pain,dizziness, SOB,CP.

## 2018-02-12 NOTE — MR AVS SNAPSHOT
Willie Nixon 
 
 
 [address] [place] [address] 
[phone] Patient: Tomasa Smith MRN: [id number] :1980 Visit Information Date & Time Provider Department Dept. Phone Encounter #  
 2018  8:30 AM Bisi Mason NP [facility] [phone] [id number] Follow-up Instructions Return if symptoms worsen or fail to improve. Upcoming Health Maintenance Date Due  
 PAP AKA CERVICAL CYTOLOGY 3/29/2020 DTaP/Tdap/Td series (2 - Tdap) 2025 Allergies as of 2018  Review Complete On: 2018 By: Maximiano Gottron, LPN Severity Noted Reaction Type Reactions Macrobid [Nitrofurantoin Monohyd/m-cryst]  2012   Intolerance Shortness of Breath Current Immunizations  Reviewed on 10/2/2015 Name Date DTaP 2015 Influenza Vaccine (Quad) PF 10/1/2015 11:36 AM  
 MMR 10/1/2015 12:55 PM  
  
 Not reviewed this visit You Were Diagnosed With   
  
 Codes Comments Cough    -  Primary ICD-10-CM: H10 ICD-9-CM: 786.2 Bronchitis     ICD-10-CM: J40 ICD-9-CM: 312 Acute URI     ICD-10-CM: J06.9 ICD-9-CM: 465.9 Vitals BP Pulse Temp Resp Height(growth percentile) Weight(growth percentile) 95/63 (BP 1 Location: Left arm, BP Patient Position: Sitting) 92 98.3 °F (36.8 °C) (Oral) 16 5' 2\" (1.575 m) 129 lb (58.5 kg) LMP SpO2 Breastfeeding? BMI OB Status Smoking Status 2018 (Approximate) 98% No 23.59 kg/m2 Having regular periods Never Smoker Vitals History BMI and BSA Data Body Mass Index Body Surface Area  
 23.59 kg/m 2 1.6 m 2 Preferred Pharmacy Pharmacy Name Phone [facility], [facility] [address] [phone] Your Updated Medication List  
  
   
This list is accurate as of: 18  9:52 AM.  Always use your most recent med list.  
  
  
  
  
 cetirizine-psuedoePHEDrine 5-120 mg per tablet Commonly known as:  ZyrTEC-D Take 1 Tab by mouth two (2) times a day. fluconazole 150 mg tablet Commonly known as:  DIFLUCAN  
take 1 tablet by mouth REPEAT IN 72 HOURS IF NEEDED  
  
 ibuprofen 800 mg tablet Commonly known as:  MOTRIN Take 1 Tab by mouth every six (6) hours as needed. JUNEL FE 1/20 (28) 1 mg-20 mcg (21)/75 mg (7) Tab Generic drug:  norethindrone-ethinyl estradiol PNV with Ca No.65-Iron Poly-FA 60 mg iron-1 mg Cap Take  by mouth. ZOLOFT 100 mg tablet Generic drug:  sertraline Take 100 mg by mouth daily. Prescriptions Sent to Pharmacy Refills  
 cetirizine-psuedoePHEDrine (ZYRTEC-D) 5-120 mg per tablet 0 Sig: Take 1 Tab by mouth two (2) times a day. Class: Normal  
 Pharmacy: [id number] [place] [address] Ph #: [phone] Route: Oral  
  
We Performed the Following AMB POC RAPID INFLUENZA TEST [62361 CPT(R)] Follow-up Instructions Return if symptoms worsen or fail to improve. Patient Instructions Bronchitis: Care Instructions Your Care Instructions Bronchitis is inflammation of the bronchial tubes, which carry air to the lungs. The tubes swell and produce mucus, or phlegm. The mucus and inflamed bronchial tubes make you cough. You may have trouble breathing. Most cases of bronchitis are caused by viruses like those that cause colds. Antibiotics usually do not help and they may be harmful. Bronchitis usually develops rapidly and lasts about 2 to 3 weeks in otherwise healthy people. Follow-up care is a key part of your treatment and safety. Be sure to make and go to all appointments, and call your doctor if you are having problems. It's also a good idea to know your test results and keep a list of the medicines you take. How can you care for yourself at home? · Take all medicines exactly as prescribed. Call your doctor if you think you are having a problem with your medicine. · Get some extra rest. 
· Take an over-the-counter pain medicine, such as acetaminophen (Tylenol), ibuprofen (Advil, Motrin), or naproxen (Aleve) to reduce fever and relieve body aches. Read and follow all instructions on the label. · Do not take two or more pain medicines at the same time unless the doctor told you to. Many pain medicines have acetaminophen, which is Tylenol. Too much acetaminophen (Tylenol) can be harmful. · Take an over-the-counter cough medicine that contains dextromethorphan to help quiet a dry, hacking cough so that you can sleep. Avoid cough medicines that have more than one active ingredient. Read and follow all instructions on the label. · Breathe moist air from a humidifier, hot shower, or sink filled with hot water. The heat and moisture will thin mucus so you can cough it out. · Do not smoke. Smoking can make bronchitis worse. If you need help quitting, talk to your doctor about stop-smoking programs and medicines. These can increase your chances of quitting for good. When should you call for help? Call 911 anytime you think you may need emergency care. For example, call if: 
? · You have severe trouble breathing. ?Call your doctor now or seek immediate medical care if: 
? · You have new or worse trouble breathing. ? · You cough up dark brown or bloody mucus (sputum). ? · You have a new or higher fever. ? · You have a new rash. ? Watch closely for changes in your health, and be sure to contact your doctor if: 
? · You cough more deeply or more often, especially if you notice more mucus or a change in the color of your mucus. ? · You are not getting better as expected. Where can you learn more? Go to http://lebron-hillary.info/. Enter H333 in the search box to learn more about \"Bronchitis: Care Instructions. \" 
 Current as of: May 12, 2017 Content Version: 11.4 © 8422-1252 [employer]. Care instructions adapted under license by [employer] (which disclaims liability or warranty for this information). If you have questions about a medical condition or this instruction, always ask your healthcare professional. [address] any warranty or liability for your use of this information. Upper Respiratory Infection (Cold): Care Instructions Your Care Instructions An upper respiratory infection, or URI, is an infection of the nose, sinuses, or throat. URIs are spread by coughs, sneezes, and direct contact. The common cold is the most frequent kind of URI. The flu and sinus infections are other kinds of URIs. Almost all URIs are caused by viruses. Antibiotics won't cure them. But you can treat most infections with home care. This may include drinking lots of fluids and taking over-the-counter pain medicine. You will probably feel better in 4 to 10 days. The doctor has checked you carefully, but problems can develop later. If you notice any problems or new symptoms, get medical treatment right away. Follow-up care is a key part of your treatment and safety. Be sure to make and go to all appointments, and call your doctor if you are having problems. It's also a good idea to know your test results and keep a list of the medicines you take. How can you care for yourself at home? · To prevent dehydration, drink plenty of fluids, enough so that your urine is light yellow or clear like water. Choose water and other caffeine-free clear liquids until you feel better. If you have kidney, heart, or liver disease and have to limit fluids, talk with your doctor before you increase the amount of fluids you drink. · Take an over-the-counter pain medicine, such as acetaminophen (Tylenol), ibuprofen (Advil, Motrin), or naproxen (Aleve). Read and follow all instructions on the label. · Before you use cough and cold medicines, check the label. These medicines may not be safe for young children or for people with certain health problems. · Be careful when taking over-the-counter cold or flu medicines and Tylenol at the same time. Many of these medicines have acetaminophen, which is Tylenol. Read the labels to make sure that you are not taking more than the recommended dose. Too much acetaminophen (Tylenol) can be harmful. · Get plenty of rest. 
· Do not smoke or allow others to smoke around you. If you need help quitting, talk to your doctor about stop-smoking programs and medicines. These can increase your chances of quitting for good. When should you call for help? Call 911 anytime you think you may need emergency care. For example, call if: 
? · You have severe trouble breathing. ?Call your doctor now or seek immediate medical care if: 
? · You seem to be getting much sicker. ? · You have new or worse trouble breathing. ? · You have a new or higher fever. ? · You have a new rash. ? Watch closely for changes in your health, and be sure to contact your doctor if: 
? · You have a new symptom, such as a sore throat, an earache, or sinus pain. ? · You cough more deeply or more often, especially if you notice more mucus or a change in the color of your mucus. ? · You do not get better as expected. Where can you learn more? Go to http://lebron-hillary.info/. Enter V775 in the search box to learn more about \"Upper Respiratory Infection (Cold): Care Instructions. \" Current as of: May 12, 2017 Content Version: 11.4 © 0473-6824 [employer]. Care instructions adapted under license by [employer] (which disclaims liability or warranty for this information).  If you have questions about a medical condition or this instruction, always ask your healthcare professional. [employer] disclaims any warranty or liability for your use of this information. Introducing [facility]! [facility] introduces Socializr patient portal. Now you can access parts of your medical record, email your doctor's office, and request medication refills online. 1. In your internet browser, go to [email or web address] 2. Click on the First Time User? Click Here link in the Sign In box. You will see the New Member Sign Up page. 3. Enter your Socializr Access Code exactly as it appears below. You will not need to use this code after youve completed the sign-up process. If you do not sign up before the expiration date, you must request a new code. · Socializr Access Code: [email or web address] Expires: 5/13/2018  9:16 AM 
 
4. Enter the last four digits of your Social Security Number (xxxx) and Date of Birth (mm/dd/yyyy) as indicated and click Submit. You will be taken to the next sign-up page. 5. Create a Socializr ID. This will be your Socializr login ID and cannot be changed, so think of one that is secure and easy to remember. 6. Create a Socializr password. You can change your password at any time. 7. Enter your Password Reset Question and Answer. This can be used at a later time if you forget your password. 8. Enter your e-mail address. You will receive e-mail notification when new information is available in [address] 9. Click Sign Up. You can now view and download portions of your medical record. 10. Click the Download Summary menu link to download a portable copy of your medical information. If you have questions, please visit the Frequently Asked Questions section of the Socializr website. Remember, Socializr is NOT to be used for urgent needs. For medical emergencies, dial 911. Now available from your iPhone and Android! Please provide this summary of care documentation to your next provider. Your primary care clinician is listed as Vicente Britton. If you have any questions after today's visit, please call [phone].

## 2018-03-22 ENCOUNTER — OFFICE VISIT (OUTPATIENT)
Dept: INTERNAL MEDICINE CLINIC | Age: 38
End: 2018-03-22

## 2018-03-22 VITALS
SYSTOLIC BLOOD PRESSURE: 114 MMHG | WEIGHT: 129 LBS | DIASTOLIC BLOOD PRESSURE: 73 MMHG | HEART RATE: 86 BPM | BODY MASS INDEX: 23.74 KG/M2 | OXYGEN SATURATION: 98 % | TEMPERATURE: 98.3 F | HEIGHT: 62 IN | RESPIRATION RATE: 14 BRPM

## 2018-03-22 DIAGNOSIS — J00 ACUTE NASOPHARYNGITIS: Primary | ICD-10-CM

## 2018-03-22 DIAGNOSIS — J02.9 SORE THROAT: ICD-10-CM

## 2018-03-22 DIAGNOSIS — R05.9 COUGH: ICD-10-CM

## 2018-03-22 RX ORDER — BENZONATATE 100 MG/1
100 CAPSULE ORAL
Qty: 30 CAP | Refills: 0 | Status: SHIPPED | OUTPATIENT
Start: 2018-03-22 | End: 2021-12-10 | Stop reason: ALTCHOICE

## 2018-03-22 NOTE — PROGRESS NOTES
Patient presents to the clinic for a productive cough that began 1 week ago. Flu Shot Requested: received    Depression Screening:  PHQ over the last two weeks 7/19/2014   Little interest or pleasure in doing things Several days   Feeling down, depressed or hopeless Several days   Total Score PHQ 2 2       Learning Assessment:  Learning Assessment 10/24/2014 7/19/2014   PRIMARY LEARNER Patient Patient   HIGHEST LEVEL OF EDUCATION - PRIMARY LEARNER  - > 4 YEARS OF COLLEGE   BARRIERS PRIMARY LEARNER - NONE   PRIMARY LANGUAGE ENGLISH ENGLISH   LEARNER PREFERENCE PRIMARY DEMONSTRATION VIDEOS   ANSWERED BY PATIENT patient   RELATIONSHIP SELF SELF       Abuse Screening:  Abuse Screening Questionnaire 2/12/2018   Do you ever feel afraid of your partner? N   Are you in a relationship with someone who physically or mentally threatens you? N   Is it safe for you to go home? Y       Health Maintenance reviewed and discussed per provider: yes     Coordination of Care:    1. Have you been to the ER, urgent care clinic since your last visit? Hospitalized since your last visit? no    2. Have you seen or consulted any other health care providers outside of the [address] [place] [address] since your last visit? Include any pap smears or colon screening.  yes

## 2018-03-22 NOTE — MR AVS SNAPSHOT
[address] [facility], [place] 
 
 
 [address] [facility] [address] 
[phone] Patient: Ximena Barton MRN: [id number] :1980 Visit Information Date & Time Provider Department Dept. Phone Encounter #  
 3/22/2018  2:15 PM Bety Castellanos NP [address] [phone] Follow-up Instructions Return if symptoms worsen or fail to improve. Upcoming Health Maintenance Date Due  
 PAP AKA CERVICAL CYTOLOGY 3/29/2020 DTaP/Tdap/Td series (2 - Tdap) 2025 Allergies as of 3/22/2018  Review Complete On: 3/22/2018 By: Irasema Aburto LPN Severity Noted Reaction Type Reactions Macrobid [Nitrofurantoin Monohyd/m-cryst]  2012   Intolerance Shortness of Breath Current Immunizations  Reviewed on 10/2/2015 Name Date DTaP 2015 Influenza Vaccine (Quad) PF 10/1/2015 11:36 AM  
 MMR 10/1/2015 12:55 PM  
  
 Not reviewed this visit You Were Diagnosed With   
  
 Codes Comments Acute nasopharyngitis    -  Primary ICD-10-CM: [place] Mao ICD-9-CM: 208 Cough     ICD-10-CM: R05 ICD-9-CM: 786.2 Sore throat     ICD-10-CM: J02.9 ICD-9-CM: 891 Vitals BP Pulse Temp Resp Height(growth percentile) Weight(growth percentile) 114/73 (BP 1 Location: Left arm, BP Patient Position: Sitting) 86 98.3 °F (36.8 °C) (Oral) 14 5' 2\" (1.575 m) 129 lb (58.5 kg) LMP SpO2 BMI OB Status Smoking Status 2018 (Approximate) 98% 23.59 kg/m2 Having regular periods Never Smoker BMI and BSA Data Body Mass Index Body Surface Area  
 23.59 kg/m 2 1.6 m 2 Preferred Pharmacy Pharmacy Name Phone [facility] [address] [phone] Your Updated Medication List  
  
   
This list is accurate as of 3/22/18  2:44 PM.  Always use your most recent med list.  
  
  
  
  
 benzonatate 100 mg capsule Commonly known as:  TESSALON Take 1 Cap by mouth three (3) times daily as needed for Cough. Indications: Cough  
  
 cetirizine-psuedoePHEDrine 5-120 mg per tablet Commonly known as:  ZyrTEC-D Take 1 Tab by mouth two (2) times a day. fluconazole 150 mg tablet Commonly known as:  DIFLUCAN  
take 1 tablet by mouth REPEAT IN 72 HOURS IF NEEDED  
  
 ibuprofen 800 mg tablet Commonly known as:  MOTRIN Take 1 Tab by mouth every six (6) hours as needed. JUNEL FE 1/20 (28) 1 mg-20 mcg (21)/75 mg (7) Tab Generic drug:  norethindrone-ethinyl estradiol PNV with Ca No.65-Iron Poly-FA 60 mg iron-1 mg Cap Take  by mouth. ZOLOFT 100 mg tablet Generic drug:  sertraline Take 100 mg by mouth daily. Prescriptions Sent to Pharmacy Refills  
 benzonatate (TESSALON) 100 mg capsule 0 Sig: Take 1 Cap by mouth three (3) times daily as needed for Cough. Indications: Cough Class: Normal  
 Pharmacy: [id number] [address] [place] [address] Ph #: [phone] Route: Oral  
  
Follow-up Instructions Return if symptoms worsen or fail to improve. Patient Instructions Sore Throat: Care Instructions Your Care Instructions Infection by bacteria or a virus causes most sore throats. Cigarette smoke, dry air, air pollution, allergies, and yelling can also cause a sore throat. Sore throats can be painful and annoying. Fortunately, most sore throats go away on their own. If you have a bacterial infection, your doctor may prescribe antibiotics. Follow-up care is a key part of your treatment and safety. Be sure to make and go to all appointments, and call your doctor if you are having problems. It's also a good idea to know your test results and keep a list of the medicines you take. How can you care for yourself at home? · If your doctor prescribed antibiotics, take them as directed.  Do not stop taking them just because you feel better. You need to take the full course of antibiotics. · Gargle with warm salt water once an hour to help reduce swelling and relieve discomfort. Use 1 teaspoon of salt mixed in 1 cup of warm water. · Take an over-the-counter pain medicine, such as acetaminophen (Tylenol), ibuprofen (Advil, Motrin), or naproxen (Aleve). Read and follow all instructions on the label. · Be careful when taking over-the-counter cold or flu medicines and Tylenol at the same time. Many of these medicines have acetaminophen, which is Tylenol. Read the labels to make sure that you are not taking more than the recommended dose. Too much acetaminophen (Tylenol) can be harmful. · Drink plenty of fluids. Fluids may help soothe an irritated throat. Hot fluids, such as tea or soup, may help decrease throat pain. · Use over-the-counter throat lozenges to soothe pain. Regular cough drops or hard candy may also help. These should not be given to young children because of the risk of choking. · Do not smoke or allow others to smoke around you. If you need help quitting, talk to your doctor about stop-smoking programs and medicines. These can increase your chances of quitting for good. · Use a vaporizer or humidifier to add moisture to your bedroom. Follow the directions for cleaning the machine. When should you call for help? Call your doctor now or seek immediate medical care if: 
? · You have new or worse trouble swallowing. ? · Your sore throat gets much worse on one side. ? Watch closely for changes in your health, and be sure to contact your doctor if you do not get better as expected. Where can you learn more? Go to http://lebron-hillary.info/. Enter 062 [id number] in the search box to learn more about \"Sore Throat: Care Instructions. \" Current as of: May 12, 2017 Content Version: 11.4 © 3965-5775 Healthwise, Incorporated.  Care instructions adapted under license by [facility] [address] (which disclaims liability or warranty for this information). If you have questions about a medical condition or this instruction, always ask your healthcare professional. [address] any warranty or liability for your use of this information. Upper Respiratory Infection (Cold): Care Instructions Your Care Instructions An upper respiratory infection, or URI, is an infection of the nose, sinuses, or throat. URIs are spread by coughs, sneezes, and direct contact. The common cold is the most frequent kind of URI. The flu and sinus infections are other kinds of URIs. Almost all URIs are caused by viruses. Antibiotics won't cure them. But you can treat most infections with home care. This may include drinking lots of fluids and taking over-the-counter pain medicine. You will probably feel better in 4 to 10 days. The doctor has checked you carefully, but problems can develop later. If you notice any problems or new symptoms, get medical treatment right away. Follow-up care is a key part of your treatment and safety. Be sure to make and go to all appointments, and call your doctor if you are having problems. It's also a good idea to know your test results and keep a list of the medicines you take. How can you care for yourself at home? · To prevent dehydration, drink plenty of fluids, enough so that your urine is light yellow or clear like water. Choose water and other caffeine-free clear liquids until you feel better. If you have kidney, heart, or liver disease and have to limit fluids, talk with your doctor before you increase the amount of fluids you drink. · Take an over-the-counter pain medicine, such as acetaminophen (Tylenol), ibuprofen (Advil, Motrin), or naproxen (Aleve). Read and follow all instructions on the label. · Before you use cough and cold medicines, check the label.  These medicines may not be safe for young children or for people with certain health problems. · Be careful when taking over-the-counter cold or flu medicines and Tylenol at the same time. Many of these medicines have acetaminophen, which is Tylenol. Read the labels to make sure that you are not taking more than the recommended dose. Too much acetaminophen (Tylenol) can be harmful. · Get plenty of rest. 
· Do not smoke or allow others to smoke around you. If you need help quitting, talk to your doctor about stop-smoking programs and medicines. These can increase your chances of quitting for good. When should you call for help? Call 911 anytime you think you may need emergency care. For example, call if: 
? · You have severe trouble breathing. ?Call your doctor now or seek immediate medical care if: 
? · You seem to be getting much sicker. ? · You have new or worse trouble breathing. ? · You have a new or higher fever. ? · You have a new rash. ? Watch closely for changes in your health, and be sure to contact your doctor if: 
? · You have a new symptom, such as a sore throat, an earache, or sinus pain. ? · You cough more deeply or more often, especially if you notice more mucus or a change in the color of your mucus. ? · You do not get better as expected. Where can you learn more? Go to http://lebron-hillary.info/. Enter Q276 in the search box to learn more about \"Upper Respiratory Infection (Cold): Care Instructions. \" Current as of: May 12, 2017 Content Version: 11.4 © 2046-7945 Healthwise, Incorporated. Care instructions adapted under license by [employer] (which disclaims liability or warranty for this information). If you have questions about a medical condition or this instruction, always ask your healthcare professional. [address] any warranty or liability for your use of this information. Zyrtec-D otc for nasal drainage and congestion in ears. Mucinex if unable to cough up mucous. Drink plenty of water with medicine. Introducing [facility]! Robert Soto introduces [employer] patient portal. Now you can access parts of your medical record, email your doctor's office, and request medication refills online. 1. In your internet browser, go to [email or web address] 2. Click on the First Time User? Click Here link in the Sign In box. You will see the New Member Sign Up page. 3. Enter your [facility] Access Code exactly as it appears below. You will not need to use this code after youve completed the sign-up process. If you do not sign up before the expiration date, you must request a new code. · [facility] Access Code: [email or web address] Expires: 5/13/2018 10:16 AM 
 
4. Enter the last four digits of your Social Security Number (xxxx) and Date of Birth (mm/dd/yyyy) as indicated and click Submit. You will be taken to the next sign-up page. 5. Create a [facility] [employer] ID. This will be your [facility] login ID and cannot be changed, so think of one that is secure and easy to remember. 6. Create a [facility] password. You can change your password at any time. 7. Enter your Password Reset Question and Answer. This can be used at a later time if you forget your password. 8. Enter your e-mail address. You will receive e-mail notification when new information is available in [address]. 9. Click Sign Up. You can now view and download portions of your medical record. 10. Click the Download Summary menu link to download a portable copy of your medical information. If you have questions, please visit the Frequently Asked Questions section of the [employer] website. Remember, [facility] is NOT to be used for urgent needs. For medical emergencies, dial 911. Now available from your iPhone and Android! Please provide this summary of care documentation to your next provider. Your primary care clinician is listed as Manuel Rai. If you have any questions after today's visit, please call [phone].

## 2018-03-22 NOTE — PATIENT INSTRUCTIONS
Sore Throat: Care Instructions  Your Care Instructions    Infection by bacteria or a virus causes most sore throats. Cigarette smoke, dry air, air pollution, allergies, and yelling can also cause a sore throat. Sore throats can be painful and annoying. Fortunately, most sore throats go away on their own. If you have a bacterial infection, your doctor may prescribe antibiotics. Follow-up care is a key part of your treatment and safety. Be sure to make and go to all appointments, and call your doctor if you are having problems. It's also a good idea to know your test results and keep a list of the medicines you take. How can you care for yourself at home? · If your doctor prescribed antibiotics, take them as directed. Do not stop taking them just because you feel better. You need to take the full course of antibiotics. · Gargle with warm salt water once an hour to help reduce swelling and relieve discomfort. Use 1 teaspoon of salt mixed in 1 cup of warm water. · Take an over-the-counter pain medicine, such as acetaminophen (Tylenol), ibuprofen (Advil, Motrin), or naproxen (Aleve). Read and follow all instructions on the label. · Be careful when taking over-the-counter cold or flu medicines and Tylenol at the same time. Many of these medicines have acetaminophen, which is Tylenol. Read the labels to make sure that you are not taking more than the recommended dose. Too much acetaminophen (Tylenol) can be harmful. · Drink plenty of fluids. Fluids may help soothe an irritated throat. Hot fluids, such as tea or soup, may help decrease throat pain. · Use over-the-counter throat lozenges to soothe pain. Regular cough drops or hard candy may also help. These should not be given to young children because of the risk of choking. · Do not smoke or allow others to smoke around you. If you need help quitting, talk to your doctor about stop-smoking programs and medicines.  These can increase your chances of quitting for good. · Use a vaporizer or humidifier to add moisture to your bedroom. Follow the directions for cleaning the machine. When should you call for help? Call your doctor now or seek immediate medical care if:  ? · You have new or worse trouble swallowing. ? · Your sore throat gets much worse on one side. ? Watch closely for changes in your health, and be sure to contact your doctor if you do not get better as expected. Where can you learn more? Go to http://lebron-hillary.info/. Enter [id number] [id number] in the search box to learn more about \"Sore Throat: Care Instructions. \"  Current as of: May 12, 2017  Content Version: 11.4  © 3913-7544 [employer]. Care instructions adapted under license by [employer] (which disclaims liability or warranty for this information). If you have questions about a medical condition or this instruction, always ask your healthcare professional. [place] [address] any warranty or liability for your use of this information. Upper Respiratory Infection (Cold): Care Instructions  Your Care Instructions    An upper respiratory infection, or URI, is an infection of the nose, sinuses, or throat. URIs are spread by coughs, sneezes, and direct contact. The common cold is the most frequent kind of URI. The flu and sinus infections are other kinds of URIs. Almost all URIs are caused by viruses. Antibiotics won't cure them. But you can treat most infections with home care. This may include drinking lots of fluids and taking over-the-counter pain medicine. You will probably feel better in 4 to 10 days. The doctor has checked you carefully, but problems can develop later. If you notice any problems or new symptoms, get medical treatment right away. Follow-up care is a key part of your treatment and safety. Be sure to make and go to all appointments, and call your doctor if you are having problems.  It's also a good idea to know your test results and keep a list of the medicines you take. How can you care for yourself at home? · To prevent dehydration, drink plenty of fluids, enough so that your urine is light yellow or clear like water. Choose water and other caffeine-free clear liquids until you feel better. If you have kidney, heart, or liver disease and have to limit fluids, talk with your doctor before you increase the amount of fluids you drink. · Take an over-the-counter pain medicine, such as acetaminophen (Tylenol), ibuprofen (Advil, Motrin), or naproxen (Aleve). Read and follow all instructions on the label. · Before you use cough and cold medicines, check the label. These medicines may not be safe for young children or for people with certain health problems. · Be careful when taking over-the-counter cold or flu medicines and Tylenol at the same time. Many of these medicines have acetaminophen, which is Tylenol. Read the labels to make sure that you are not taking more than the recommended dose. Too much acetaminophen (Tylenol) can be harmful. · Get plenty of rest.  · Do not smoke or allow others to smoke around you. If you need help quitting, talk to your doctor about stop-smoking programs and medicines. These can increase your chances of quitting for good. When should you call for help? Call 911 anytime you think you may need emergency care. For example, call if:  ? · You have severe trouble breathing. ?Call your doctor now or seek immediate medical care if:  ? · You seem to be getting much sicker. ? · You have new or worse trouble breathing. ? · You have a new or higher fever. ? · You have a new rash. ? Watch closely for changes in your health, and be sure to contact your doctor if:  ? · You have a new symptom, such as a sore throat, an earache, or sinus pain. ? · You cough more deeply or more often, especially if you notice more mucus or a change in the color of your mucus. ? · You do not get better as expected. Where can you learn more? Go to http://lebron-hillary.info/. Enter G180 in the search box to learn more about \"Upper Respiratory Infection (Cold): Care Instructions. \"  Current as of: May 12, 2017  Content Version: 11.4  © 8096-2214 [employer]. Care instructions adapted under license by [employer] (which disclaims liability or warranty for this information). If you have questions about a medical condition or this instruction, always ask your healthcare professional. [address] any warranty or liability for your use of this information. Zyrtec-D otc for nasal drainage and congestion in ears. Mucinex if unable to cough up mucous. Drink plenty of water with medicine.

## 2018-03-22 NOTE — PROGRESS NOTES
HISTORY OF PRESENT ILLNESS  Rod Crowe is a 42 y.o. female. HPI Comments: Patient presents today c/o cough and cold symptoms, onset x1 week, with associated yellow-green mucous, postnasal drainage, irritation of ears and sore throat. Reports both children with URI symptoms. Pt has tried otc zyrtec-d and mucinex-d with mild relief but s.e of \"not feeling like myself\". Denies fever, chills, myalgias, CP, SOB, wheezing, sinus pain, HA, n/v/d. Cough   The history is provided by the patient. This is a new problem. The current episode started more than 2 days ago. The problem occurs daily. The problem has not changed since onset. Pertinent negatives include no chest pain, no abdominal pain, no headaches and no shortness of breath. The symptoms are aggravated by coughing. Nothing relieves the symptoms. Treatments tried: zyrtec-d, mucinex-d. URI    The history is provided by the patient. This is a new problem. The current episode started more than 2 days ago. The problem has not changed since onset. There has been no fever. Associated symptoms include plugged ear sensation, sore throat and cough. Pertinent negatives include no chest pain, no abdominal pain, no diarrhea, no nausea, no vomiting, no congestion, no ear pain, no headaches, no rhinorrhea, no sinus pain, no swollen glands and no wheezing. She has tried other medications for the symptoms. Review of Systems   Constitutional: Positive for malaise/fatigue. Negative for chills, diaphoresis and fever. HENT: Positive for sore throat. Negative for congestion, ear pain, rhinorrhea and sinus pain. Respiratory: Positive for cough and sputum production. Negative for shortness of breath and wheezing. Cardiovascular: Negative for chest pain. Gastrointestinal: Negative for abdominal pain, diarrhea, nausea and vomiting. Musculoskeletal: Negative for myalgias. Neurological: Negative for headaches.        Physical Exam   Constitutional: Vital signs are normal. She appears well-developed and well-nourished. She does not appear ill. HENT:   Right Ear: Tympanic membrane, external ear and ear canal normal.   Left Ear: Tympanic membrane, external ear and ear canal normal.   Nose: Nose normal. Right sinus exhibits no maxillary sinus tenderness and no frontal sinus tenderness. Left sinus exhibits no maxillary sinus tenderness and no frontal sinus tenderness. Mouth/Throat: Uvula is midline and mucous membranes are normal. Posterior oropharyngeal erythema (with clear drainage) present. No oropharyngeal exudate or posterior oropharyngeal edema. Cardiovascular: Normal rate, regular rhythm and normal heart sounds. Pulmonary/Chest: Effort normal and breath sounds normal.   Lymphadenopathy:     She has no cervical adenopathy. Visit Vitals    /73 (BP 1 Location: Left arm, BP Patient Position: Sitting)    Pulse 86    Temp 98.3 °F (36.8 °C) (Oral)    Resp 14    Ht 5' 2\" (1.575 m)    Wt 129 lb (58.5 kg)    SpO2 98%    BMI 23.59 kg/m2      ASSESSMENT and PLAN    ICD-10-CM ICD-9-CM    1. Acute nasopharyngitis J00 460 Symptom management- otc zyrtec-d prn, fluids, rest, humidifier, nasal rinses   2. Cough R05 786.2 benzonatate (TESSALON) 100 mg capsule   3. Sore throat J02.9 462 Fluids, tea w honey, gargles     Reviewed plan with patient including diagnoses, treatment and follow up. Provided AVS with education on above diagnoses incl. Specific symptom management. Advise to take either zyrtec-d or mucinex-d and not together. Take mucinex if cough becomes dry and congested. Verbalize understanding- no further questions/concerns at this time. Pt to follow up as scheduled or sooner if symptoms worsen/fail to improve.

## 2018-10-20 ENCOUNTER — OFFICE VISIT (OUTPATIENT)
Dept: INTERNAL MEDICINE CLINIC | Age: 38
End: 2018-10-20

## 2018-10-20 VITALS
HEART RATE: 69 BPM | OXYGEN SATURATION: 95 % | SYSTOLIC BLOOD PRESSURE: 116 MMHG | HEIGHT: 62 IN | WEIGHT: 127.4 LBS | TEMPERATURE: 98.3 F | BODY MASS INDEX: 23.45 KG/M2 | RESPIRATION RATE: 14 BRPM | DIASTOLIC BLOOD PRESSURE: 73 MMHG

## 2018-10-20 DIAGNOSIS — J40 BRONCHITIS: Primary | ICD-10-CM

## 2018-10-20 RX ORDER — BISMUTH SUBSALICYLATE 262 MG
1 TABLET,CHEWABLE ORAL DAILY
COMMUNITY

## 2018-10-20 RX ORDER — BENZONATATE 100 MG/1
100 CAPSULE ORAL
Qty: 21 CAP | Refills: 0 | Status: SHIPPED | OUTPATIENT
Start: 2018-10-20 | End: 2018-10-27

## 2018-10-20 RX ORDER — PREDNISONE 5 MG/1
TABLET ORAL
Qty: 21 TAB | Refills: 0 | Status: SHIPPED | OUTPATIENT
Start: 2018-10-20 | End: 2022-04-20 | Stop reason: ALTCHOICE

## 2018-10-20 NOTE — PROGRESS NOTES
Patient presents to the clinic for a productive cough that began 1 week ago. Patient complains fatigue, headache, sinus pressure. Patient states she tried dayquil and mucinex  D and received no relief.

## 2018-10-20 NOTE — PATIENT INSTRUCTIONS
Bronchitis: Care Instructions  Your Care Instructions    Bronchitis is inflammation of the bronchial tubes, which carry air to the lungs. The tubes swell and produce mucus, or phlegm. The mucus and inflamed bronchial tubes make you cough. You may have trouble breathing. Most cases of bronchitis are caused by viruses like those that cause colds. Antibiotics usually do not help and they may be harmful. Bronchitis usually develops rapidly and lasts about 2 to 3 weeks in otherwise healthy people. Follow-up care is a key part of your treatment and safety. Be sure to make and go to all appointments, and call your doctor if you are having problems. It's also a good idea to know your test results and keep a list of the medicines you take. How can you care for yourself at home? · Take all medicines exactly as prescribed. Call your doctor if you think you are having a problem with your medicine. · Get some extra rest.  · Take an over-the-counter pain medicine, such as acetaminophen (Tylenol), ibuprofen (Advil, Motrin), or naproxen (Aleve) to reduce fever and relieve body aches. Read and follow all instructions on the label. · Do not take two or more pain medicines at the same time unless the doctor told you to. Many pain medicines have acetaminophen, which is Tylenol. Too much acetaminophen (Tylenol) can be harmful. · Take an over-the-counter cough medicine that contains dextromethorphan to help quiet a dry, hacking cough so that you can sleep. Avoid cough medicines that have more than one active ingredient. Read and follow all instructions on the label. · Breathe moist air from a humidifier, hot shower, or sink filled with hot water. The heat and moisture will thin mucus so you can cough it out. · Do not smoke. Smoking can make bronchitis worse. If you need help quitting, talk to your doctor about stop-smoking programs and medicines. These can increase your chances of quitting for good.   When should you call for help? Call 911 anytime you think you may need emergency care. For example, call if:    · You have severe trouble breathing.    Call your doctor now or seek immediate medical care if:    · You have new or worse trouble breathing.     · You cough up dark brown or bloody mucus (sputum).     · You have a new or higher fever.     · You have a new rash.    Watch closely for changes in your health, and be sure to contact your doctor if:    · You cough more deeply or more often, especially if you notice more mucus or a change in the color of your mucus.     · You are not getting better as expected. Where can you learn more? Go to http://lebron-hillary.info/. Enter H333 in the search box to learn more about \"Bronchitis: Care Instructions. \"  Current as of: December 6, 2017  Content Version: 11.8  © 5654-7440 [employer]. Care instructions adapted under license by [employer] (which disclaims liability or warranty for this information). If you have questions about a medical condition or this instruction, always ask your healthcare professional. [address] any warranty or liability for your use of this information.

## 2018-10-20 NOTE — PROGRESS NOTES
HISTORY OF PRESENT ILLNESS  Tapan Mao is a 42 y.o. female. Cough   The history is provided by the patient. This is a new problem. The current episode started more than 1 week ago. The problem occurs constantly. The problem has been gradually worsening. Associated symptoms include headaches. Pertinent negatives include no chest pain, no abdominal pain and no shortness of breath. Nothing aggravates the symptoms. Nothing relieves the symptoms. Treatments tried: mucinex. The treatment provided no relief. Pressure Behind the Eyes    Associated symptoms include congestion, sore throat, cough and headaches. Pertinent negatives include no chills, no shortness of breath, no neck pain, no neck pain and no chest pain. Review of Systems   Constitutional: Positive for malaise/fatigue. Negative for chills and fever. HENT: Positive for congestion and sore throat. Negative for ear discharge. Eyes: Negative for blurred vision and double vision. Respiratory: Positive for cough and sputum production. Negative for shortness of breath and wheezing. Cardiovascular: Negative for chest pain and palpitations. Gastrointestinal: Negative for abdominal pain, heartburn, nausea and vomiting. Musculoskeletal: Negative for myalgias and neck pain. Neurological: Positive for headaches. Negative for dizziness. Psychiatric/Behavioral: The patient is not nervous/anxious. Physical Exam   Constitutional: She is oriented to person, place, and time. She appears well-developed and well-nourished. No distress. HENT:   Head: Normocephalic and atraumatic. Right Ear: External ear normal.   Left Ear: External ear normal.   Mouth/Throat: Oropharynx is clear and moist. No oropharyngeal exudate. Eyes: EOM are normal. Pupils are equal, round, and reactive to light. Neck: Neck supple. Cardiovascular: Regular rhythm. Pulmonary/Chest: Breath sounds normal. No respiratory distress. She has no wheezes. Neurological: She is alert and oriented to person, place, and time. No cranial nerve deficit. Skin: Skin is dry. She is not diaphoretic. Psychiatric: She has a normal mood and affect. Nursing note and vitals reviewed. ASSESSMENT and PLAN    ICD-10-CM ICD-9-CM    1. Bronchitis J40 490 predniSONE (STERAPRED) 5 mg dose pack      benzonatate (TESSALON) 100 mg capsule   Patient advised to take medication as prescribed. Take rest and plenty of fluids. Alternate tylenol and ibuprofen for fever. Return to clinic if condition worsens in next 72 hours.

## 2018-10-22 DIAGNOSIS — J40 BRONCHITIS: Primary | ICD-10-CM

## 2018-10-22 RX ORDER — AZITHROMYCIN 250 MG/1
TABLET, FILM COATED ORAL
Qty: 6 TAB | Refills: 0 | Status: SHIPPED | OUTPATIENT
Start: 2018-10-22 | End: 2018-10-27

## 2019-09-04 ENCOUNTER — HOSPITAL ENCOUNTER (OUTPATIENT)
Dept: MAMMOGRAPHY | Age: 39
Discharge: HOME | End: 2019-09-04
Attending: OBSTETRICS & GYNECOLOGY
Payer: COMMERCIAL

## 2019-09-04 DIAGNOSIS — Z12.31 VISIT FOR SCREENING MAMMOGRAM: ICD-10-CM

## 2019-09-04 PROCEDURE — 77063 BREAST TOMOSYNTHESIS BI: CPT

## 2019-09-09 ENCOUNTER — OFFICE VISIT (OUTPATIENT)
Dept: INTERNAL MEDICINE CLINIC | Age: 39
End: 2019-09-09

## 2019-09-09 VITALS
TEMPERATURE: 98.8 F | WEIGHT: 123 LBS | DIASTOLIC BLOOD PRESSURE: 73 MMHG | BODY MASS INDEX: 22.63 KG/M2 | SYSTOLIC BLOOD PRESSURE: 111 MMHG | OXYGEN SATURATION: 98 % | RESPIRATION RATE: 18 BRPM | HEIGHT: 62 IN | HEART RATE: 73 BPM

## 2019-09-09 DIAGNOSIS — J01.10 ACUTE NON-RECURRENT FRONTAL SINUSITIS: Primary | ICD-10-CM

## 2019-09-09 RX ORDER — HYDROCODONE POLISTIREX AND CHLORPHENIRAMINE POLISTIREX 10; 8 MG/5ML; MG/5ML
1 SUSPENSION, EXTENDED RELEASE ORAL
Qty: 70 ML | Refills: 0 | Status: SHIPPED | OUTPATIENT
Start: 2019-09-09 | End: 2019-09-16

## 2019-09-09 RX ORDER — FLUCONAZOLE 150 MG/1
150 TABLET ORAL DAILY
Qty: 1 TAB | Refills: 1 | Status: SHIPPED | OUTPATIENT
Start: 2019-09-09 | End: 2019-09-10

## 2019-09-09 RX ORDER — CEFUROXIME AXETIL 500 MG/1
500 TABLET ORAL 2 TIMES DAILY
Qty: 20 TAB | Refills: 1 | Status: SHIPPED | OUTPATIENT
Start: 2019-09-09 | End: 2021-12-10

## 2019-09-09 NOTE — PROGRESS NOTES
ROOM # 135 [facility] presents today for   Chief Complaint   Patient presents with    Sinus Pain     X 2 weeks        Aki Tinajero preferred language for health care discussion is english/other. Is someone accompanying this pt? no    Is the patient using any DME equipment during [address]? no    Depression Screening:  3 most recent PHQ Screens 7/19/2014   Little interest or pleasure in doing things Several days   Feeling down, depressed, irritable, or hopeless Several days   Total Score PHQ 2 2       Learning Assessment:  Learning Assessment 10/24/2014 7/19/2014   PRIMARY LEARNER Patient Patient   HIGHEST LEVEL OF EDUCATION - PRIMARY LEARNER  - > 4 YEARS OF COLLEGE   BARRIERS PRIMARY LEARNER - NONE   PRIMARY LANGUAGE ENGLISH ENGLISH   LEARNER PREFERENCE PRIMARY DEMONSTRATION VIDEOS   ANSWERED BY PATIENT patient   RELATIONSHIP SELF SELF       Abuse Screening:  Abuse Screening Questionnaire 2/12/2018   Do you ever feel afraid of your partner? N   Are you in a relationship with someone who physically or mentally threatens you? N   Is it safe for you to go home? Y       Fall Risk  No flowsheet data found. Health Maintenance reviewed and discussed per provider. Yes    Aki Tinajero is due for   Health Maintenance Due   Topic Date Due    Influenza Age 5 to Adult  08/01/2019     Please order/place referral if appropriate. Advance Directive:  1. Do you have an advance directive in place? Patient Reply: no    2. If not, would you like material regarding how to put one in place? Patient Reply: no    Coordination of Care:  1. Have you been to the ER, urgent care clinic since your last visit? Hospitalized since your last visit? no    2. Have you seen or consulted any other health care providers outside of the [address] [place] [address] since your last visit? Include any pap smears or colon screening.  no

## 2019-09-09 NOTE — PATIENT INSTRUCTIONS
Sinusitis: Care Instructions  Your Care Instructions    Sinusitis is an infection of the lining of the sinus cavities in your head. Sinusitis often follows a cold. It causes pain and pressure in your head and face. In most cases, sinusitis gets better on its own in 1 to 2 weeks. But some mild symptoms may last for several weeks. Sometimes antibiotics are needed. Follow-up care is a key part of your treatment and safety. Be sure to make and go to all appointments, and call your doctor if you are having problems. It's also a good idea to know your test results and keep a list of the medicines you take. How can you care for yourself at home? · Take an over-the-counter pain medicine, such as acetaminophen (Tylenol), ibuprofen (Advil, Motrin), or naproxen (Aleve). Read and follow all instructions on the label. · If the doctor prescribed antibiotics, take them as directed. Do not stop taking them just because you feel better. You need to take the full course of antibiotics. · Be careful when taking over-the-counter cold or flu medicines and Tylenol at the same time. Many of these medicines have acetaminophen, which is Tylenol. Read the labels to make sure that you are not taking more than the recommended dose. Too much acetaminophen (Tylenol) can be harmful. · Breathe warm, moist air from a steamy shower, a hot bath, or a sink filled with hot water. Avoid cold, dry air. Using a humidifier in your home may help. Follow the directions for cleaning the machine. · Use saline (saltwater) nasal washes to help keep your nasal passages open and wash out mucus and bacteria. You can buy saline nose drops at a grocery store or drugstore. Or you can make your own at home by adding 1 teaspoon of salt and 1 teaspoon of baking soda to 2 cups of distilled water. If you make your own, fill a bulb syringe with the solution, insert the tip into your nostril, and squeeze gently. [place] your nose.   · Put a hot, wet towel or a warm gel pack on your face 3 or 4 times a day for 5 to 10 minutes each time. · Try a decongestant nasal spray like oxymetazoline (Afrin). Do not use it for more than 3 days in a row. Using it for more than 3 days can make your congestion worse. When should you call for help? Call your doctor now or seek immediate medical care if:    · You have new or worse swelling or redness in your face or around your eyes.     · You have a new or higher fever.    Watch closely for changes in your health, and be sure to contact your doctor if:    · You have new or worse facial pain.     · The mucus from your nose becomes thicker (like pus) or has new blood in it.     · You are not getting better as expected. Where can you learn more? Go to http://lebron-hillary.info/. Enter S354 in the search box to learn more about \"Sinusitis: Care Instructions. \"  Current as of: October 21, 2018  Content Version: 12.1  © 7050-5610 [employer]. Care instructions adapted under license by [employer] (which disclaims liability or warranty for this information). If you have questions about a medical condition or this instruction, always ask your healthcare professional. [place] [address] any warranty or liability for your use of this information.

## 2019-09-09 NOTE — PROGRESS NOTES
HISTORY OF PRESENT ILLNESS  Scarlet Gordon is a 34 y.o. female. Visit Vitals  /73 (BP 1 Location: Right arm, BP Patient Position: Sitting)   Pulse 73   Temp 98.8 °F (37.1 °C) (Oral)   Resp 18   Ht 5' 2\" (1.575 m)   Wt 123 lb (55.8 kg)   SpO2 98%   BMI 22.50 kg/m²       Sinus Infection    The history is provided by the patient. This is a new problem. The current episode started more than 1 week ago (over 2 weeks). The problem has not changed since onset. There has been no fever. The pain is mild. Associated symptoms include congestion, sore throat, cough and headaches. Pertinent negatives include no chills. She has tried acetaminophen and decongestants (neti pot, nasal rinse, steam, ) for the symptoms. The treatment provided no relief. Review of Systems   Constitutional: Negative for chills and fever. HENT: Positive for congestion, sinus pain and sore throat. Respiratory: Positive for cough. Neurological: Positive for headaches. Physical Exam   Constitutional: She is oriented to person, place, and time. She appears well-developed and well-nourished. HENT:   Right Ear: Tympanic membrane, external ear and ear canal normal.   Left Ear: External ear and ear canal normal. Tympanic membrane is injected and retracted. Nose: Mucosal edema present. Right sinus exhibits maxillary sinus tenderness and frontal sinus tenderness. Left sinus exhibits maxillary sinus tenderness and frontal sinus tenderness. Mouth/Throat: Uvula is midline and oropharynx is clear and moist. No oropharyngeal exudate. Slight irritated thin streaks in throat   Cardiovascular: Normal rate and regular rhythm. Pulmonary/Chest: Effort normal and breath sounds normal.   Lymphadenopathy:     She has no cervical adenopathy. Neurological: She is alert and oriented to person, place, and time. Skin: Skin is warm and dry. She is not diaphoretic. Psychiatric: She has a normal mood and affect.    Nursing note and vitals reviewed. ASSESSMENT and PLAN    ICD-10-CM ICD-9-CM    1. Acute non-recurrent frontal sinusitis J01.10 461.1 cefUROXime (CEFTIN) 500 mg tablet      fluconazole (DIFLUCAN) 150 mg tablet      chlorpheniramine-HYDROcodone (TUSSIONEX PENNKINETIC ER) 10-8 mg/5 mL suspension       Has tried lots of OTC  Meds  Has tried good treatment with steam and nasal sprays    Sick over 2 weeks    Will tx with antibiotic and cough meds.     F/u prn

## 2019-09-17 ENCOUNTER — HOSPITAL ENCOUNTER (OUTPATIENT)
Dept: MAMMOGRAPHY | Age: 39
Discharge: HOME | End: 2019-09-17
Attending: OBSTETRICS & GYNECOLOGY
Payer: COMMERCIAL

## 2019-09-17 DIAGNOSIS — R92.2 INCONCLUSIVE MAMMOGRAM: ICD-10-CM

## 2019-09-17 DIAGNOSIS — R92.8 ABNORMALITY OF RIGHT BREAST ON SCREENING MAMMOGRAM: ICD-10-CM

## 2019-09-17 DIAGNOSIS — R92.0 MICROCALCIFICATION OF RIGHT BREAST ON MAMMOGRAM: ICD-10-CM

## 2019-09-17 PROCEDURE — 77065 DX MAMMO INCL CAD UNI: CPT

## 2020-06-30 ENCOUNTER — HOSPITAL ENCOUNTER (OUTPATIENT)
Dept: LAB | Age: 40
Discharge: HOME | End: 2020-06-30
Payer: COMMERCIAL

## 2020-06-30 PROCEDURE — 88175 CYTOPATH C/V AUTO FLUID REDO: CPT

## 2020-06-30 PROCEDURE — 87624 HPV HI-RISK TYP POOLED RSLT: CPT

## 2020-09-25 ENCOUNTER — HOSPITAL ENCOUNTER (OUTPATIENT)
Dept: MAMMOGRAPHY | Age: 40
Discharge: HOME | End: 2020-09-25
Attending: OBSTETRICS & GYNECOLOGY
Payer: COMMERCIAL

## 2020-09-25 DIAGNOSIS — Z12.31 VISIT FOR SCREENING MAMMOGRAM: ICD-10-CM

## 2020-09-25 PROCEDURE — 77063 BREAST TOMOSYNTHESIS BI: CPT

## 2021-10-01 ENCOUNTER — HOSPITAL ENCOUNTER (OUTPATIENT)
Dept: WOMENS IMAGING | Age: 41
Discharge: HOME | End: 2021-10-01
Payer: COMMERCIAL

## 2021-10-01 DIAGNOSIS — Z12.31 VISIT FOR SCREENING MAMMOGRAM: ICD-10-CM

## 2021-10-01 PROCEDURE — 77063 BREAST TOMOSYNTHESIS BI: CPT

## 2021-12-06 ENCOUNTER — TELEPHONE (OUTPATIENT)
Dept: INTERNAL MEDICINE CLINIC | Age: 41
End: 2021-12-06

## 2021-12-06 DIAGNOSIS — Z00.00 LABORATORY EXAM ORDERED AS PART OF ROUTINE GENERAL MEDICAL EXAMINATION: ICD-10-CM

## 2021-12-06 DIAGNOSIS — Z00.00 ROUTINE PHYSICAL EXAMINATION: Primary | ICD-10-CM

## 2021-12-06 NOTE — TELEPHONE ENCOUNTER
----- Message from Branden Juarez sent at 12/6/2021 10:25 AM EST -----  Subject: Message to Provider    QUESTIONS  Information for Provider? Pt has an appt on 12/10/21 at 8:45. She is   getting annual physical and would like to get bloodwork done prior to the   appt. Please put in orders for bloodwork. Please call pt to advise when   this has been done.  ---------------------------------------------------------------------------  --------------  CALL BACK INFO  What is the best way for the office to contact you? OK to leave message on   voicemail  Preferred Call Back Phone Number? [phone]  ---------------------------------------------------------------------------  --------------  SCRIPT ANSWERS  Relationship to Patient?  Self

## 2021-12-06 NOTE — TELEPHONE ENCOUNTER
I have never seen this patient, and she is in a 15-minute slot for a physical Friday, December 10, please put her in at 830 instead of 845 for 30-minute physical, thanks

## 2021-12-07 NOTE — TELEPHONE ENCOUNTER
Patient has two appointments, both 15 minutes. She has never been seen at [facility], she needs a 30 minute slot if she has seen Dr. Pina Mansfield in the past.  If she has not then we need to let her know she is not accepting new patients at this time.

## 2021-12-09 ENCOUNTER — HOSPITAL ENCOUNTER (OUTPATIENT)
Dept: LAB | Age: 41
Discharge: HOME | End: 2021-12-09
Payer: COMMERCIAL

## 2021-12-09 ENCOUNTER — APPOINTMENT (OUTPATIENT)
Dept: INTERNAL MEDICINE CLINIC | Age: 41
End: 2021-12-09

## 2021-12-09 DIAGNOSIS — Z00.00 LABORATORY EXAM ORDERED AS PART OF ROUTINE GENERAL MEDICAL EXAMINATION: ICD-10-CM

## 2021-12-09 LAB
ALBUMIN SERPL-MCNC: 3.7 G/DL (ref 3.4–5)
ALBUMIN/GLOB SERPL: 1 {RATIO} (ref 0.8–1.7)
ALP SERPL-CCNC: 53 U/L (ref 45–117)
ALT SERPL-CCNC: 18 U/L (ref 13–56)
ANION GAP SERPL CALC-SCNC: 9 MMOL/L (ref 3–18)
AST SERPL-CCNC: 24 U/L (ref 10–38)
BASOPHILS # BLD: 0.1 K/UL (ref 0–0.1)
BASOPHILS NFR BLD: 1 % (ref 0–2)
BILIRUB SERPL-MCNC: 0.4 MG/DL (ref 0.2–1)
BUN SERPL-MCNC: 11 MG/DL (ref 7–18)
BUN/CREAT SERPL: 17 (ref 12–20)
CALCIUM SERPL-MCNC: 8.9 MG/DL (ref 8.5–10.1)
CHLORIDE SERPL-SCNC: 103 MMOL/L (ref 100–111)
CHOLEST SERPL-MCNC: 163 MG/DL
CO2 SERPL-SCNC: 27 MMOL/L (ref 21–32)
CREAT SERPL-MCNC: 0.64 MG/DL (ref 0.6–1.3)
DIFFERENTIAL METHOD BLD: NORMAL
EOSINOPHIL # BLD: 0.1 K/UL (ref 0–0.4)
EOSINOPHIL NFR BLD: 1 % (ref 0–5)
ERYTHROCYTE [DISTWIDTH] IN BLOOD BY AUTOMATED COUNT: 12 % (ref 11.6–14.5)
GLOBULIN SER CALC-MCNC: 3.6 G/DL (ref 2–4)
GLUCOSE SERPL-MCNC: 76 MG/DL (ref 74–99)
HCT VFR BLD AUTO: 42.7 % (ref 35–45)
HDLC SERPL-MCNC: 67 MG/DL (ref 40–60)
HDLC SERPL: 2.4 {RATIO} (ref 0–5)
HGB BLD-MCNC: 13.9 G/DL (ref 12–16)
IMM GRANULOCYTES # BLD AUTO: 0 K/UL (ref 0–0.04)
IMM GRANULOCYTES NFR BLD AUTO: 0 % (ref 0–0.5)
LDLC SERPL CALC-MCNC: 82.2 MG/DL (ref 0–100)
LIPID PROFILE,FLP: ABNORMAL
LYMPHOCYTES # BLD: 2.3 K/UL (ref 0.9–3.6)
LYMPHOCYTES NFR BLD: 23 % (ref 21–52)
MCH RBC QN AUTO: 30.6 PG (ref 24–34)
MCHC RBC AUTO-ENTMCNC: 32.6 G/DL (ref 31–37)
MCV RBC AUTO: 94.1 FL (ref 78–100)
MONOCYTES # BLD: 0.8 K/UL (ref 0.05–1.2)
MONOCYTES NFR BLD: 7 % (ref 3–10)
NEUTS SEG # BLD: 6.9 K/UL (ref 1.8–8)
NEUTS SEG NFR BLD: 68 % (ref 40–73)
NRBC # BLD: 0 K/UL (ref 0–0.01)
NRBC BLD-RTO: 0 PER 100 WBC
PLATELET # BLD AUTO: 239 K/UL (ref 135–420)
PMV BLD AUTO: 11.2 FL (ref 9.2–11.8)
POTASSIUM SERPL-SCNC: 4.1 MMOL/L (ref 3.5–5.5)
PROT SERPL-MCNC: 7.3 G/DL (ref 6.4–8.2)
RBC # BLD AUTO: 4.54 M/UL (ref 4.2–5.3)
SODIUM SERPL-SCNC: 139 MMOL/L (ref 136–145)
TRIGL SERPL-MCNC: 69 MG/DL (ref ?–150)
VLDLC SERPL CALC-MCNC: 13.8 MG/DL
WBC # BLD AUTO: 10.2 K/UL (ref 4.6–13.2)

## 2021-12-09 PROCEDURE — 85025 COMPLETE CBC W/AUTO DIFF WBC: CPT

## 2021-12-09 PROCEDURE — 80053 COMPREHEN METABOLIC PANEL: CPT

## 2021-12-09 PROCEDURE — 80061 LIPID PANEL: CPT

## 2021-12-09 PROCEDURE — 36415 COLL VENOUS BLD VENIPUNCTURE: CPT

## 2021-12-10 ENCOUNTER — OFFICE VISIT (OUTPATIENT)
Dept: INTERNAL MEDICINE CLINIC | Age: 41
End: 2021-12-10

## 2021-12-10 VITALS
TEMPERATURE: 97.7 F | OXYGEN SATURATION: 98 % | HEIGHT: 62 IN | BODY MASS INDEX: 22.63 KG/M2 | WEIGHT: 123 LBS | SYSTOLIC BLOOD PRESSURE: 116 MMHG | HEART RATE: 85 BPM | RESPIRATION RATE: 18 BRPM | DIASTOLIC BLOOD PRESSURE: 72 MMHG

## 2021-12-10 DIAGNOSIS — R20.0 LEFT FACIAL NUMBNESS: ICD-10-CM

## 2021-12-10 DIAGNOSIS — Z00.00 ROUTINE PHYSICAL EXAMINATION: Primary | ICD-10-CM

## 2021-12-10 DIAGNOSIS — F32.0 CURRENT MILD EPISODE OF MAJOR DEPRESSIVE DISORDER, UNSPECIFIED WHETHER RECURRENT (HCC): ICD-10-CM

## 2021-12-10 PROCEDURE — 99396 PREV VISIT EST AGE 40-64: CPT | Performed by: INTERNAL MEDICINE

## 2021-12-10 RX ORDER — BUPROPION HYDROCHLORIDE 200 MG/1
200 TABLET, EXTENDED RELEASE ORAL 2 TIMES DAILY
COMMUNITY

## 2021-12-10 NOTE — PROGRESS NOTES
HPI     Nathaly Martel is a 42-year-old female known to me from my previous practice. She is receiving her antidepressants from psychiatry, Dr. Lonny Duque. She had an injury to of her left big toenail, and it is now growing back slowly, but is dystrophic. She also stepped on some glass and had the area repaired with surgical glue out of town, she wants to make sure this area looks okay. As far she knows, there was no retained foreign body. Her children are in  in fourth grade. She has been having intermittent tingling in a rectangular shaped area in the left parietal area. No visual changes or headaches, denies nausea. Past Medical History:   Diagnosis Date    Anxiety and depression     post partum depression    Fetal growth restriction     Rubella non-immune status, antepartum         Past Surgical History:   Procedure Laterality Date    HX  SECTION      HX  SECTION      HX CYST REMOVAL      right shoulder        Current Outpatient Medications   Medication Sig Dispense Refill    buPROPion SR (Wellbutrin SR) 200 mg SR tablet Take 200 mg by mouth two (2) times a day.  multivitamin (ONE A DAY) tablet Take 1 Tab by mouth daily.  JUNEL FE 1/20, , 1 mg-20 mcg (21)/75 mg (7) tab   0    sertraline (ZOLOFT) 100 mg tablet Take 125 mg by mouth daily.       predniSONE (STERAPRED) 5 mg dose pack See administration instruction per 5mg dose pack (Patient not taking: Reported on 12/10/2021) 21 Tab 0        Allergies   Allergen Reactions    Macrobid [Nitrofurantoin Monohyd/M-Cryst] Shortness of Breath        Social History     Socioeconomic History    Marital status:      Spouse name: Not on file    Number of children: Not on file    Years of education: Not on file    Highest education level: Not on file   Occupational History    Not on file   Tobacco Use    Smoking status: Never Smoker    Smokeless tobacco: Never Used   Substance and Sexual Activity    Alcohol use: Yes     Alcohol/week: 1.0 standard drink     Types: 1 Glasses of wine per week     Comment: socially- 3 weekly    Drug use: No    Sexual activity: Yes     Partners: Male   Other Topics Concern    Not on file   Social History Narrative    Not on file     Social Determinants of Health     Financial Resource Strain:     Difficulty of Paying Living Expenses: Not on file   Food Insecurity:     Worried About Running Out of Food in the Last Year: Not on file    Latasha of Food in the Last Year: Not on file   Transportation Needs:     Lack of Transportation (Medical): Not on file    Lack of Transportation (Non-Medical):  Not on file   Physical Activity:     Days of Exercise per Week: Not on file    Minutes of Exercise per Session: Not on file   Stress:     Feeling of Stress : Not on file   Social Connections:     Frequency of Communication with Friends and Family: Not on file    Frequency of Social Gatherings with Friends and Family: Not on file    Attends Zoroastrian Services: Not on file    Active Member of [address] [place] [address] or Organizations: Not on file    Attends Club or Organization Meetings: Not on file    Marital Status: Not on file   Intimate Partner Violence:     Fear of Current or Ex-Partner: Not on file    Emotionally Abused: Not on file    Physically Abused: Not on file    Sexually Abused: Not on file   Housing Stability:     Unable to Pay for Housing in the Last Year: Not on file    Number of [place] in the Last Year: Not on file    Unstable Housing in the Last Year: Not on file        Family History   Problem Relation Age of Onset    Heart Disease Mother         t[place]u    Hypertension Father     Cancer Father         spleen    Heart Attack Father     Epilepsy Brother            Visit Vitals  /72   Pulse 85   Temp 97.7 °F (36.5 °C) (Temporal)   Resp 18   Ht 5' 2\" (1.575 m)   Wt 123 lb (55.8 kg)   LMP 12/05/2021 (Exact Date)   SpO2 98%   BMI 22.50 kg/m²        Review of Systems   Eyes: Negative for blurred vision. Respiratory: Negative for shortness of breath. Cardiovascular: Negative for chest pain. Gastrointestinal: Negative for blood in stool. Genitourinary: Negative for hematuria. Neurological: Negative for headaches. Physical Exam  Constitutional:       General: She is not in acute distress. Appearance: She is normal weight. HENT:      Head: Normocephalic and atraumatic. Right Ear: Tympanic membrane, ear canal and external ear normal. There is no impacted cerumen. Left Ear: Tympanic membrane, ear canal and external ear normal. There is no impacted cerumen. Eyes:      General: No scleral icterus. Conjunctiva/sclera: Conjunctivae normal.   Neck:      Comments: Bilateral carotid upstrokes normal to palpation. Lymph: No cervical, supraclavicular, or axillary lymphadenopathy. Cardiovascular:      Rate and Rhythm: Normal rate and regular rhythm. Pulses: Normal pulses. Heart sounds: No murmur heard. No friction rub. No gallop. Pulmonary:      Effort: Pulmonary effort is normal.      Breath sounds: Normal breath sounds. Abdominal:      General: Abdomen is flat. Palpations: Abdomen is soft. Tenderness: There is no abdominal tenderness. Musculoskeletal:      Cervical back: Neck supple. Comments: No clubbing, cyanosis, or edema. Left great toenail partially regrown. 1 x 1 cm² scarlike area on the bottom of her left foot. No foreign body palpated, no erythema, warmth, discharge, or crepitus. Skin:     General: Skin is warm and dry. Neurological:      Mental Status: She is alert and oriented to person, place, and time. Psychiatric:         Mood and Affect: Mood normal.                  Diagnoses and all orders for this visit:    1. Routine physical examination  Comments:  Normal blood pressure, non-smoker, up-to-date GYN. Lipids in good range. Has had flu and Covid vaccines.     2. Left facial numbness  Comments:  Check carotid duplex, rule out significant stenosis. Orders:  -     DUPLEX CAROTID BILATERAL; Future    3.  Current mild episode of major depressive disorder, unspecified whether recurrent ([facility] [address]  Comments:  Appears controlled, medication per psychiatrist.         Follow-up and Dispositions    · Return for 30 minute physical.              Shawn Rivera MD

## 2021-12-10 NOTE — PROGRESS NOTES
Tessa Badillo presents today for   Chief Complaint   Patient presents with    Physical       Is someone accompanying this pt? no    Is the patient using any DME equipment during OV? no    Depression Screening:  3 most recent PHQ Screens 7/19/2014   Little interest or pleasure in doing things Several days   Feeling down, depressed, irritable, or hopeless Several days   Total Score PHQ 2 2       Learning Assessment:  Learning Assessment 10/24/2014   PRIMARY LEARNER Patient   HIGHEST LEVEL OF EDUCATION - PRIMARY LEARNER  -   BARRIERS PRIMARY LEARNER -   PRIMARY LANGUAGE ENGLISH   LEARNER PREFERENCE PRIMARY DEMONSTRATION   ANSWERED BY PATIENT   RELATIONSHIP SELF       Health Maintenance reviewed and discussed and ordered per Provider. Health Maintenance Due   Topic Date Due    Hepatitis C Screening  Never done    COVID-19 Vaccine (1) Never done    Flu Vaccine (1) 09/01/2021   . Coordination of Care:  1. Have you been to the ER, urgent care clinic since your last visit? Hospitalized since your last visit? no    2. Have you seen or consulted any other health care providers outside of the [address] [place] [address] since your last visit? Include any pap smears or colon screening. no    3. For patients aged 39-70: Has the patient had a colonoscopy? NA based on age or sex     If the patient is female:    3. For patients aged 41-77: Has the patient had a mammogram within the past 2 years? Yes, HM satisfied with blue hyperlink    5. For patients aged 21-65: Has the patient had a pap smear?  Yes, HM satisfied with blue hyperlink

## 2021-12-24 DIAGNOSIS — R20.0 LEFT FACIAL NUMBNESS: ICD-10-CM

## 2022-01-18 ENCOUNTER — TELEPHONE (OUTPATIENT)
Dept: INTERNAL MEDICINE CLINIC | Age: 42
End: 2022-01-18

## 2022-01-18 ENCOUNTER — HOSPITAL ENCOUNTER (OUTPATIENT)
Dept: VASCULAR SURGERY | Age: 42
Discharge: HOME | End: 2022-01-18
Attending: INTERNAL MEDICINE
Payer: COMMERCIAL

## 2022-01-18 LAB
LEFT BULB EDV: 22 CM/S
LEFT BULB PSV: 57.5 CM/S
LEFT CCA DIST DIAS: 28.4 CM/S
LEFT CCA DIST SYS: 75.3 CM/S
LEFT CCA MID DIAS: 20.35 CM/S
LEFT CCA MID SYS: 84.96 CM/S
LEFT CCA PROX DIAS: 31.7 CM/S
LEFT CCA PROX SYS: 104.3 CM/S
LEFT ECA DIAS: 13.89 CM/S
LEFT ECA SYS: 73.7 CM/S
LEFT ICA DIST DIAS: 49.4 CM/S
LEFT ICA DIST SYS: 94.7 CM/S
LEFT ICA MID DIAS: 39.7 CM/S
LEFT ICA MID SYS: 93 CM/S
LEFT ICA PROX DIAS: 26.8 CM/S
LEFT ICA PROX SYS: 64 CM/S
LEFT ICA/CCA SYS: 1.26
LEFT VERTEBRAL DIAS: 15.51 CM/S
LEFT VERTEBRAL SYS: 49.4 CM/S
RIGHT BULB EDV: 11.6 CM/S
RIGHT BULB PSV: 39.1 CM/S
RIGHT CCA DIST DIAS: 22 CM/S
RIGHT CCA DIST SYS: 75.3 CM/S
RIGHT CCA MID DIAS: 33.27 CM/S
RIGHT CCA MID SYS: 99.5 CM/S
RIGHT CCA PROX DIAS: 28.4 CM/S
RIGHT CCA PROX SYS: 102.7 CM/S
RIGHT ECA DIAS: 8.54 CM/S
RIGHT ECA SYS: 69.4 CM/S
RIGHT ICA DIST DIAS: 39.7 CM/S
RIGHT ICA DIST SYS: 102.7 CM/S
RIGHT ICA MID DIAS: 38.1 CM/S
RIGHT ICA MID SYS: 101.1 CM/S
RIGHT ICA PROX DIAS: 26.8 CM/S
RIGHT ICA PROX SYS: 88.2 CM/S
RIGHT ICA/CCA SYS: 1.4
RIGHT VERTEBRAL DIAS: 17.6 CM/S
RIGHT VERTEBRAL SYS: 59 CM/S
VAS LEFT SUBCLAVIAN PROX EDV: 0 CM/S
VAS LEFT SUBCLAVIAN PROX PSV: 89.8 CM/S
VAS RIGHT SUBCLAVIAN PROX EDV: 0 CM/S
VAS RIGHT SUBCLAVIAN PROX PSV: 94.7 CM/S

## 2022-01-18 PROCEDURE — 93880 EXTRACRANIAL BILAT STUDY: CPT

## 2022-01-18 NOTE — TELEPHONE ENCOUNTER
The next step would be a CT scan of her brain, including the skull, let me know if she agrees to that, and where to set up the test.

## 2022-01-18 NOTE — TELEPHONE ENCOUNTER
Pt returned call and given Dr Al Moon message. She ask, do you have any further recommendations? She still has the pins and needles around her left ear.

## 2022-01-19 NOTE — TELEPHONE ENCOUNTER
Left name and call back number; see message below:      The next step would be a CT scan of her brain, including the skull, let me know if she agrees to that, and where to set up the test.

## 2022-01-19 NOTE — TELEPHONE ENCOUNTER
Patient would like to speak directly to Dr. Dai Hamilton because she wants to know if the risk out weigh just not have CT done. Patient states the pins and needles above her left ear come and go, and she does not know if she should just monitor it.

## 2022-03-19 PROBLEM — R05.9 COUGH: Status: ACTIVE | Noted: 2017-02-21

## 2022-03-19 PROBLEM — R52 BODY ACHES: Status: ACTIVE | Noted: 2017-02-21

## 2022-03-19 PROBLEM — R50.9 FEVER: Status: ACTIVE | Noted: 2017-03-21

## 2022-03-19 PROBLEM — J34.89 FRONTAL SINUS PAIN: Status: ACTIVE | Noted: 2017-02-21

## 2022-04-19 ENCOUNTER — TELEPHONE (OUTPATIENT)
Dept: INTERNAL MEDICINE CLINIC | Age: 42
End: 2022-04-19

## 2022-04-19 ENCOUNTER — PATIENT MESSAGE (OUTPATIENT)
Dept: INTERNAL MEDICINE CLINIC | Age: 42
End: 2022-04-19

## 2022-04-19 DIAGNOSIS — H93.19 TINNITUS, UNSPECIFIED LATERALITY: Primary | ICD-10-CM

## 2022-04-19 NOTE — TELEPHONE ENCOUNTER
From: Josh Conte  To: Anthony Sullivan MD  Sent: 4/19/2022 2:37 PM EDT  Subject: Rj Augustine - tinnitus - Referral Needed     Hi Dr. Candelaria Christina,  I had Rj Augustine over the last week and this past weekend (April 16/17), I noticed constant ringing in both ears. It is like what happens after you go to a concert. I called [facility] ([phone]) to schedule an appointment, but was told I have to have a referral.     Are you to make this referral? I read information that said this can be helped/reduced, but you have to get on medicine immediately. If you need to discuss further, please feel free to call me. [phone].     Sincerely,  Noman Bañuelos

## 2022-04-19 NOTE — TELEPHONE ENCOUNTER
Pt had covid last week. Now she has a constant ringing in her ears. Wants to know if she would see you for this or a specialist? Have you heard of this as a side effect of covid?

## 2022-04-20 ENCOUNTER — PATIENT MESSAGE (OUTPATIENT)
Dept: INTERNAL MEDICINE CLINIC | Age: 42
End: 2022-04-20

## 2022-04-20 DIAGNOSIS — H93.19 TINNITUS, UNSPECIFIED LATERALITY: Primary | ICD-10-CM

## 2022-04-20 RX ORDER — PREDNISONE 10 MG/1
TABLET ORAL
Qty: 20 TABLET | Refills: 1 | Status: SHIPPED | OUTPATIENT
Start: 2022-04-20 | End: 2022-04-25 | Stop reason: ALTCHOICE

## 2022-07-14 ENCOUNTER — OFFICE VISIT (OUTPATIENT)
Dept: INTERNAL MEDICINE CLINIC | Age: 42
End: 2022-07-14
Payer: COMMERCIAL

## 2022-07-14 VITALS
HEART RATE: 87 BPM | DIASTOLIC BLOOD PRESSURE: 66 MMHG | BODY MASS INDEX: 22.56 KG/M2 | WEIGHT: 122.6 LBS | OXYGEN SATURATION: 99 % | TEMPERATURE: 97.5 F | RESPIRATION RATE: 18 BRPM | SYSTOLIC BLOOD PRESSURE: 101 MMHG | HEIGHT: 62 IN

## 2022-07-14 DIAGNOSIS — R20.0 NUMBNESS AND TINGLING OF LEFT SIDE OF FACE: Primary | ICD-10-CM

## 2022-07-14 DIAGNOSIS — R20.2 NUMBNESS AND TINGLING OF LEFT SIDE OF FACE: Primary | ICD-10-CM

## 2022-07-14 PROCEDURE — 99214 OFFICE O/P EST MOD 30 MIN: CPT | Performed by: INTERNAL MEDICINE

## 2022-07-14 NOTE — PROGRESS NOTES
Lilliam Heatonsam presents today for   Chief Complaint   Patient presents with    Follow Up Chronic Condition   Patient stated that tested positive positive April 14th 2022    Is someone accompanying this pt? no    Is the patient using any DME equipment during [address]? no    Depression Screening:  3 most recent PHQ Screens 7/14/2022   Little interest or pleasure in doing things Not at all   Feeling down, depressed, irritable, or hopeless Not at all   Total Score PHQ 2 0       Learning Assessment:  Learning Assessment 10/24/2014   PRIMARY LEARNER Patient   HIGHEST LEVEL OF EDUCATION - PRIMARY LEARNER  -   BARRIERS PRIMARY LEARNER -   PRIMARY LANGUAGE ENGLISH   LEARNER PREFERENCE PRIMARY DEMONSTRATION   ANSWERED BY PATIENT   RELATIONSHIP SELF       Health Maintenance reviewed and discussed and ordered per Provider. Health Maintenance Due   Topic Date Due    Depression Monitoring  Never done    COVID-19 Vaccine (3 - Booster for Pfizer series) 04/18/2022   .    1. \"Have you been to the ER, urgent care clinic since your last visit? Hospitalized since your last visit? \" No    2. \"Have you seen or consulted any other health care providers outside of the [address] [place] [address] since your last visit? \" No     3. For patients aged 39-70: Has the patient had a colonoscopy / FIT/ Cologuard? NA - based on age      If the patient is female:    4. For patients aged 41-77: Has the patient had a mammogram within the past 2 years? Yes - no Care Gap present      5. For patients aged 21-65: Has the patient had a pap smear?  Yes - no Care Gap present

## 2022-07-20 NOTE — PROGRESS NOTES
HPI     Letter has had increased frequency of an area of tingling and numbness just above her left ear. She has noticed that it is worse when she is stressed. She is not sure if she clenches her jaw or not. The symptoms resolved spontaneously after a few minutes. No associated symptoms reported. No new family history. This has been going on for several months. Past Medical History:   Diagnosis Date    Anxiety and depression     post partum depression    Fetal growth restriction     Rubella non-immune status, antepartum         Past Surgical History:   Procedure Laterality Date    HX  SECTION      HX  SECTION      HX CYST REMOVAL      right shoulder        Current Outpatient Medications   Medication Sig Dispense Refill    buPROPion SR (Wellbutrin SR) 200 mg SR tablet Take 200 mg by mouth two (2) times a day. multivitamin (ONE A DAY) tablet Take 1 Tab by mouth daily. JUNEL FE 1/20, 28, 1 mg-20 mcg (21)/75 mg (7) tab   0    sertraline (ZOLOFT) 100 mg tablet Take 125 mg by mouth daily. Allergies   Allergen Reactions    Macrobid [Nitrofurantoin Monohyd/M-Cryst] Shortness of Breath        Social History     Socioeconomic History    Marital status:      Spouse name: Not on file    Number of children: Not on file    Years of education: Not on file    Highest education level: Not on file   Occupational History    Not on file   Tobacco Use    Smoking status: Never    Smokeless tobacco: Never   Substance and Sexual Activity    Alcohol use:  Yes     Alcohol/week: 1.0 standard drink     Types: 1 Glasses of wine per week     Comment: socially- 3 weekly    Drug use: No    Sexual activity: Yes     Partners: Male   Other Topics Concern    Not on file   Social History Narrative    Not on file     Social Determinants of Health     Financial Resource Strain: Not on file   Food Insecurity: Not on file   Transportation Needs: Not on file   Physical Activity: Not on file   Stress: Not on file   Social Connections: Not on file   Intimate Partner Violence: Not on file   Housing Stability: Not on file        Family History   Problem Relation Age of Onset    Heart Disease Mother         takabotsu    Hypertension Father     Cancer Father         spleen    Heart Attack Father     Epilepsy Brother            Visit Vitals  /66   Pulse 87   Temp 97.5 °F (36.4 °C) (Temporal)   Resp 18   Ht 5' 2\" (1.575 m)   Wt 122 lb 9.6 oz (55.6 kg)   LMP 07/01/2022 (Approximate)   SpO2 99%   BMI 22.42 kg/m²        Review of Systems   Constitutional:  Negative for chills and fever. Eyes:  Negative for blurred vision. Respiratory:  Negative for shortness of breath. Cardiovascular:  Negative for chest pain. Gastrointestinal:  Negative for blood in stool. Genitourinary:  Negative for hematuria. Denies urinary incontinence. Neurological:  Negative for speech change and headaches. Denies any limb tingling or numbness. Symptoms are not worse in the heat   Psychiatric/Behavioral:  Negative for depression. Physical Exam  Constitutional:       General: She is not in acute distress. Appearance: She is normal weight. She is not ill-appearing. HENT:      Head: Normocephalic and atraumatic. Left Ear: Tympanic membrane, ear canal and external ear normal.   Eyes:      General: No scleral icterus. Conjunctiva/sclera: Conjunctivae normal.   Neck:      Comments: Bilateral carotid upstrokes normal to palpation. Cardiovascular:      Rate and Rhythm: Normal rate and regular rhythm. Pulses: Normal pulses. Heart sounds: No murmur heard. No friction rub. No gallop. Pulmonary:      Effort: Pulmonary effort is normal.      Breath sounds: Normal breath sounds. Abdominal:      General: Abdomen is flat. Palpations: Abdomen is soft. Tenderness: There is no abdominal tenderness. Musculoskeletal:      Cervical back: Neck supple.       Comments: No clubbing, cyanosis, or edema. Calves nontender, no cords. Skin:     General: Skin is warm and dry. Neurological:      Mental Status: She is alert and oriented to person, place, and time. Comments: Cranial nerves II through XII intact. There is no rash or abnormality in the area of intermittent numbness. Normal upper and lower extremity motor, no dysmetria, no pronator drift. Psychiatric:         Mood and Affect: Mood normal.        I spent 30 minutes in review of EMR prior to patient arrival, and update of history, review of systems, exam, discussion of differential diagnosis, and ordering of tests, as well as with completion of chart. Diagnoses and all orders for this visit:    1. Numbness and tingling of left side of face  Comments:  Not suggestive of trigeminal neuralgia.   Check MRI of brain without contrast, rule out multiple sclerosis, consider MR angiogram of brain if worsens  Orders:  -     MRI BRAIN WO CONT; Future                  Goldie Becker MD

## 2022-07-21 DIAGNOSIS — R20.0 NUMBNESS AND TINGLING OF LEFT SIDE OF FACE: ICD-10-CM

## 2022-07-21 DIAGNOSIS — R20.2 NUMBNESS AND TINGLING OF LEFT SIDE OF FACE: ICD-10-CM

## 2022-08-12 ENCOUNTER — HOSPITAL ENCOUNTER (OUTPATIENT)
Age: 42
Discharge: HOME | End: 2022-08-12
Attending: INTERNAL MEDICINE
Payer: COMMERCIAL

## 2022-08-12 PROCEDURE — 70551 MRI BRAIN STEM W/O DYE: CPT

## 2022-08-15 ENCOUNTER — TELEPHONE (OUTPATIENT)
Dept: INTERNAL MEDICINE CLINIC | Age: 42
End: 2022-08-15

## 2022-08-15 NOTE — TELEPHONE ENCOUNTER
Shows enlarged gland in throat, any sore throat or cold symptoms at the time?   I recommend waiting a few weeks and doing a cT scan of her neck to follow up this lymph node and see if there are any others-- if agrees, do order for CT neck with IV contrast in 2 to 3 weeks at facility of choice, dx follow up retropharyngeal adenopathy seen on RAGHAVENDRA of brain

## 2022-08-15 NOTE — TELEPHONE ENCOUNTER
Regarding: MRI Results  ----- Message from Shelley Torres LPN sent at 1/23/8782  2:36 PM EDT -----       ----- Message from Hayden Eckert to Richard Nielson MD sent at 8/15/2022  2:23 PM -----   Hello,  I read the results from my MRI. Can you please let me know in layman's terms for what it means? Do I need to come in to see you?

## 2022-08-16 DIAGNOSIS — R59.9 GLANDS SWOLLEN: ICD-10-CM

## 2022-08-16 DIAGNOSIS — R59.9 ADENOPATHY: Primary | ICD-10-CM

## 2022-08-16 NOTE — TELEPHONE ENCOUNTER
Patient agrees with MD's recommendation. HBV is fine. Order CT  neck with IV contrast 2 to 3 weeks facility.

## 2022-08-16 NOTE — TELEPHONE ENCOUNTER
Unable to leave name and call back number due to full mail box. See message below:        Shows enlarged gland in throat, any sore throat or cold symptoms at the time?   I recommend waiting a few weeks and doing a cT scan of her neck to follow up this lymph node and see if there are any others-- if agrees, do order for CT neck with IV contrast in 2 to 3 weeks at facility of choice, dx follow up retropharyngeal adenopathy seen on RAGHAVENDRA of brain

## 2022-08-29 ENCOUNTER — TELEPHONE (OUTPATIENT)
Dept: INTERNAL MEDICINE CLINIC | Age: 42
End: 2022-08-29

## 2022-08-29 NOTE — TELEPHONE ENCOUNTER
----- Message from Rad Miles LPN sent at 2/91/5498  3:33 PM EDT -----  Regarding: FW: Sore throat    ----- Message -----  From: Judy Workman  Sent: 8/29/2022  11:47 AM EDT  To: [facility]  Subject: Sore throat                                      Hi.  Just confirming you think I should get a CT - soft tissue of the neck with contrast? Im just checking to see if you recommend a CT for the head too? The order now is for soft tissue neck with contrast.  Thank you.

## 2022-09-06 DIAGNOSIS — R59.9 ADENOPATHY: ICD-10-CM

## 2022-09-06 DIAGNOSIS — R59.9 GLANDS SWOLLEN: ICD-10-CM

## 2022-09-09 ENCOUNTER — HOSPITAL ENCOUNTER (OUTPATIENT)
Dept: CT IMAGING | Age: 42
Discharge: HOME | End: 2022-09-09
Attending: INTERNAL MEDICINE
Payer: COMMERCIAL

## 2022-09-09 PROCEDURE — 74011000636 HC RX REV CODE- 636: Performed by: INTERNAL MEDICINE

## 2022-09-09 PROCEDURE — 70491 CT SOFT TISSUE NECK W/DYE: CPT

## 2022-09-09 RX ADMIN — IOPAMIDOL 85 ML: 612 INJECTION, SOLUTION INTRAVENOUS at 09:04

## 2022-09-12 ENCOUNTER — TELEPHONE (OUTPATIENT)
Dept: INTERNAL MEDICINE CLINIC | Age: 42
End: 2022-09-12

## 2022-09-19 ENCOUNTER — TELEPHONE (OUTPATIENT)
Dept: INTERNAL MEDICINE CLINIC | Age: 42
End: 2022-09-19

## 2022-09-19 DIAGNOSIS — J20.9 ACUTE BRONCHITIS, UNSPECIFIED ORGANISM: Primary | ICD-10-CM

## 2022-09-19 RX ORDER — AZITHROMYCIN 250 MG/1
TABLET, FILM COATED ORAL
Qty: 6 TABLET | Refills: 0 | Status: SHIPPED | OUTPATIENT
Start: 2022-09-19 | End: 2022-09-24

## 2022-09-19 NOTE — TELEPHONE ENCOUNTER
----- Message from Sumit Espinoza LPN sent at 1/39/1244  8:28 AM EDT -----  Regarding: FW: Sick - not covid    ----- Message -----  From: Fady Akers  Sent: 9/19/2022   8:00 AM EDT  To: [facility]  Subject: Sick - not covid                                 Hello,  I have been sick on and off for over a month now. Yesterday I felt the worst.  I have a deep cough wet cough, runny nose, blowing nose, etc.  This morning I spit up dark brown phlegm. I did a COVID test and it was negative. My temp. is 97.9. Can Dr. Raisa Barrett call in a Z pack?     Thank you,  Nichole Quinteros

## 2022-09-19 NOTE — TELEPHONE ENCOUNTER
Pt sent a My Chart message earlier but called back because she hasn't heard anything.   I went ahead and scheduled her for a Virtual Visit today

## 2022-10-14 ENCOUNTER — HOSPITAL ENCOUNTER (OUTPATIENT)
Dept: WOMENS IMAGING | Age: 42
Discharge: HOME | End: 2022-10-14
Attending: OBSTETRICS & GYNECOLOGY
Payer: COMMERCIAL

## 2022-10-14 DIAGNOSIS — Z12.31 ENCOUNTER FOR SCREENING MAMMOGRAM FOR BREAST CANCER: ICD-10-CM

## 2022-10-14 PROCEDURE — 77063 BREAST TOMOSYNTHESIS BI: CPT

## 2022-11-09 ENCOUNTER — PATIENT MESSAGE (OUTPATIENT)
Dept: INTERNAL MEDICINE CLINIC | Age: 42
End: 2022-11-09

## 2022-11-09 DIAGNOSIS — B37.31 CANDIDAL VAGINITIS: Primary | ICD-10-CM

## 2022-11-09 RX ORDER — FLUCONAZOLE 150 MG/1
TABLET ORAL
Qty: 2 TABLET | Refills: 0 | Status: SHIPPED | OUTPATIENT
Start: 2022-11-09

## 2022-11-09 NOTE — TELEPHONE ENCOUNTER
From: Amanda Robledo  To: Wm Reynolds MD  Sent: 11/9/2022 10:30 AM EST  Subject: Yeast infection Rx    Hello,  I have a yeast infection and am also having my period. Can you please call in a prescription for the oral yeast infection pill? I use the [facility] at [address] [place] [address] in [place]. Thank you.

## 2022-12-06 ENCOUNTER — TELEPHONE (OUTPATIENT)
Dept: INTERNAL MEDICINE CLINIC | Age: 42
End: 2022-12-06

## 2022-12-06 ENCOUNTER — HOSPITAL ENCOUNTER (OUTPATIENT)
Dept: LAB | Age: 42
Discharge: HOME | End: 2022-12-06
Payer: COMMERCIAL

## 2022-12-06 ENCOUNTER — APPOINTMENT (OUTPATIENT)
Dept: INTERNAL MEDICINE CLINIC | Age: 42
End: 2022-12-06

## 2022-12-06 DIAGNOSIS — Z00.00 LABORATORY EXAM ORDERED AS PART OF ROUTINE GENERAL MEDICAL EXAMINATION: ICD-10-CM

## 2022-12-06 DIAGNOSIS — Z00.00 LABORATORY EXAM ORDERED AS PART OF ROUTINE GENERAL MEDICAL EXAMINATION: Primary | ICD-10-CM

## 2022-12-06 LAB
ALBUMIN SERPL-MCNC: 3.5 G/DL (ref 3.4–5)
ALBUMIN/GLOB SERPL: 1.2 {RATIO} (ref 0.8–1.7)
ALP SERPL-CCNC: 39 U/L (ref 45–117)
ALT SERPL-CCNC: 18 U/L (ref 13–56)
ANION GAP SERPL CALC-SCNC: 4 MMOL/L (ref 3–18)
AST SERPL-CCNC: 18 U/L (ref 10–38)
BASOPHILS # BLD: 0.1 K/UL (ref 0–0.1)
BASOPHILS NFR BLD: 1 % (ref 0–2)
BILIRUB SERPL-MCNC: 0.2 MG/DL (ref 0.2–1)
BUN SERPL-MCNC: 19 MG/DL (ref 7–18)
BUN/CREAT SERPL: 25 (ref 12–20)
CALCIUM SERPL-MCNC: 9.1 MG/DL (ref 8.5–10.1)
CHLORIDE SERPL-SCNC: 106 MMOL/L (ref 100–111)
CHOLEST SERPL-MCNC: 160 MG/DL
CO2 SERPL-SCNC: 29 MMOL/L (ref 21–32)
CREAT SERPL-MCNC: 0.77 MG/DL (ref 0.6–1.3)
DIFFERENTIAL METHOD BLD: NORMAL
EOSINOPHIL # BLD: 0.1 K/UL (ref 0–0.4)
EOSINOPHIL NFR BLD: 2 % (ref 0–5)
ERYTHROCYTE [DISTWIDTH] IN BLOOD BY AUTOMATED COUNT: 12.3 % (ref 11.6–14.5)
GLOBULIN SER CALC-MCNC: 2.9 G/DL (ref 2–4)
GLUCOSE SERPL-MCNC: 93 MG/DL (ref 74–99)
HCT VFR BLD AUTO: 41.6 % (ref 35–45)
HDLC SERPL-MCNC: 71 MG/DL (ref 40–60)
HDLC SERPL: 2.3 {RATIO} (ref 0–5)
HGB BLD-MCNC: 13.3 G/DL (ref 12–16)
IMM GRANULOCYTES # BLD AUTO: 0 K/UL (ref 0–0.04)
IMM GRANULOCYTES NFR BLD AUTO: 0 % (ref 0–0.5)
LDLC SERPL CALC-MCNC: 75 MG/DL (ref 0–100)
LIPID PROFILE,FLP: ABNORMAL
LYMPHOCYTES # BLD: 1.8 K/UL (ref 0.9–3.6)
LYMPHOCYTES NFR BLD: 29 % (ref 21–52)
MCH RBC QN AUTO: 31.1 PG (ref 24–34)
MCHC RBC AUTO-ENTMCNC: 32 G/DL (ref 31–37)
MCV RBC AUTO: 97.2 FL (ref 78–100)
MONOCYTES # BLD: 0.6 K/UL (ref 0.05–1.2)
MONOCYTES NFR BLD: 9 % (ref 3–10)
NEUTS SEG # BLD: 3.8 K/UL (ref 1.8–8)
NEUTS SEG NFR BLD: 60 % (ref 40–73)
NRBC # BLD: 0 K/UL (ref 0–0.01)
NRBC BLD-RTO: 0 PER 100 WBC
PLATELET # BLD AUTO: 231 K/UL (ref 135–420)
PMV BLD AUTO: 11.2 FL (ref 9.2–11.8)
POTASSIUM SERPL-SCNC: 4.6 MMOL/L (ref 3.5–5.5)
PROT SERPL-MCNC: 6.4 G/DL (ref 6.4–8.2)
RBC # BLD AUTO: 4.28 M/UL (ref 4.2–5.3)
SODIUM SERPL-SCNC: 139 MMOL/L (ref 136–145)
TRIGL SERPL-MCNC: 70 MG/DL (ref ?–150)
VLDLC SERPL CALC-MCNC: 14 MG/DL
WBC # BLD AUTO: 6.4 K/UL (ref 4.6–13.2)

## 2022-12-06 PROCEDURE — 80053 COMPREHEN METABOLIC PANEL: CPT

## 2022-12-06 PROCEDURE — 36415 COLL VENOUS BLD VENIPUNCTURE: CPT

## 2022-12-06 PROCEDURE — 80061 LIPID PANEL: CPT

## 2022-12-06 PROCEDURE — 85025 COMPLETE CBC W/AUTO DIFF WBC: CPT

## 2022-12-13 ENCOUNTER — HOSPITAL ENCOUNTER (OUTPATIENT)
Dept: LAB | Age: 42
Discharge: HOME | End: 2022-12-13
Payer: COMMERCIAL

## 2022-12-13 ENCOUNTER — TELEPHONE (OUTPATIENT)
Dept: INTERNAL MEDICINE CLINIC | Age: 42
End: 2022-12-13

## 2022-12-13 ENCOUNTER — OFFICE VISIT (OUTPATIENT)
Dept: INTERNAL MEDICINE CLINIC | Age: 42
End: 2022-12-13
Payer: COMMERCIAL

## 2022-12-13 VITALS
OXYGEN SATURATION: 98 % | BODY MASS INDEX: 23 KG/M2 | HEART RATE: 71 BPM | HEIGHT: 62 IN | SYSTOLIC BLOOD PRESSURE: 130 MMHG | RESPIRATION RATE: 18 BRPM | DIASTOLIC BLOOD PRESSURE: 79 MMHG | TEMPERATURE: 97.5 F | WEIGHT: 125 LBS

## 2022-12-13 DIAGNOSIS — R20.0 NUMBNESS AND TINGLING OF LEFT SIDE OF FACE: ICD-10-CM

## 2022-12-13 DIAGNOSIS — R30.0 DYSURIA: ICD-10-CM

## 2022-12-13 DIAGNOSIS — R20.2 NUMBNESS AND TINGLING OF LEFT SIDE OF FACE: ICD-10-CM

## 2022-12-13 DIAGNOSIS — F32.0 CURRENT MILD EPISODE OF MAJOR DEPRESSIVE DISORDER, UNSPECIFIED WHETHER RECURRENT (HCC): ICD-10-CM

## 2022-12-13 DIAGNOSIS — Z00.00 ROUTINE PHYSICAL EXAMINATION: Primary | ICD-10-CM

## 2022-12-13 DIAGNOSIS — R39.11 URINARY HESITANCY: ICD-10-CM

## 2022-12-13 LAB
BILIRUB UR QL STRIP: NEGATIVE
GLUCOSE UR-MCNC: NEGATIVE MG/DL
KETONES P FAST UR STRIP-MCNC: NEGATIVE MG/DL
PH UR STRIP: 5.5 [PH] (ref 4.6–8)
PROT UR QL STRIP: NEGATIVE
SP GR UR STRIP: 1.02 (ref 1–1.03)
UA UROBILINOGEN AMB POC: NORMAL (ref 0.2–1)
URINALYSIS CLARITY POC: NORMAL
URINALYSIS COLOR POC: NORMAL
URINE BLOOD POC: NORMAL
URINE LEUKOCYTES POC: NORMAL
URINE NITRITES POC: NEGATIVE

## 2022-12-13 PROCEDURE — 87086 URINE CULTURE/COLONY COUNT: CPT

## 2022-12-13 NOTE — PROGRESS NOTES
STEW     Teddy Turner is here for routine physical exam.  She has been having urinary symptoms for a few months. There is some dysuria/irritation. She also has a feeling that she is not emptying her bladder completely, so gets up frequently at night to urinate. She is not concerned for any STDs, but after discussion, I am going to check mycoplasma and Ureaplasma in her urine. She continues to have intermittent tingling in the left side of her scalp, usually when she is stressed. She had a particularly bad bout of that earlier this year, during a time of intense stress. She sees a psychiatrist and is on Wellbutrin. On questioning, her depression is controlled on this. Past Medical History:   Diagnosis Date    Anxiety and depression     post partum depression    Fetal growth restriction     Rubella non-immune status, antepartum         Past Surgical History:   Procedure Laterality Date    HX  SECTION      HX  SECTION      HX CYST REMOVAL      right shoulder        Current Outpatient Medications   Medication Sig Dispense Refill    buPROPion SR (WELLBUTRIN, ZYBAN) 200 mg SR tablet Take 200 mg by mouth two (2) times a day.  multivitamin (ONE A DAY) tablet Take 1 Tab by mouth daily.  JUNEL FE 1/20, , 1 mg-20 mcg (21)/75 mg (7) tab   0    sertraline (ZOLOFT) 100 mg tablet Take 125 mg by mouth daily. Allergies   Allergen Reactions    Macrobid [Nitrofurantoin Monohyd/M-Cryst] Shortness of Breath        Social History     Socioeconomic History    Marital status:      Spouse name: Not on file    Number of children: Not on file    Years of education: Not on file    Highest education level: Not on file   Occupational History    Not on file   Tobacco Use    Smoking status: Never    Smokeless tobacco: Never   Substance and Sexual Activity    Alcohol use:  Yes     Alcohol/week: 1.0 standard drink     Types: 1 Glasses of wine per week     Comment: socially- 3 weekly    Drug use: No    Sexual activity: Yes     Partners: Male   Other Topics Concern    Not on file   Social History Narrative    Not on file     Social Determinants of Health     Financial Resource Strain: Not on file   Food Insecurity: Not on file   Transportation Needs: Not on file   Physical Activity: Not on file   Stress: Not on file   Social Connections: Not on file   Intimate Partner Violence: Not on file   Housing Stability: Not on file        Family History   Problem Relation Age of Onset    Heart Disease Mother         takabotsu    Hypertension Father     Cancer Father         spleen    Heart Attack Father     Epilepsy Brother            Visit Vitals  /79   Pulse 71   Temp 97.5 °F (36.4 °C) (Temporal)   Resp 18   Ht 5' 2\" (1.575 m)   Wt 125 lb (56.7 kg)   SpO2 98%   BMI 22.86 kg/m²        Review of Systems   Constitutional:  Negative for fever. Respiratory:  Negative for shortness of breath. Cardiovascular:  Negative for chest pain. Gastrointestinal:  Negative for blood in stool. Genitourinary:  Negative for hematuria. Psychiatric/Behavioral:  Negative for depression. The patient is not nervous/anxious. Physical Exam  Constitutional:       General: She is not in acute distress. Appearance: She is normal weight. HENT:      Right Ear: Tympanic membrane, ear canal and external ear normal.      Left Ear: Tympanic membrane, ear canal and external ear normal.   Eyes:      General: No scleral icterus. Conjunctiva/sclera: Conjunctivae normal.   Neck:      Comments: Note upstrokes normal to palpation. Lymph: No cervical, supraclavicular, or axillary lymphadenopathy. Cardiovascular:      Rate and Rhythm: Normal rate and regular rhythm. Pulses: Normal pulses. Heart sounds: No friction rub. No gallop. Pulmonary:      Effort: Pulmonary effort is normal.      Breath sounds: Normal breath sounds. Abdominal:      General: Abdomen is flat.       Palpations: Abdomen is soft. Tenderness: There is no abdominal tenderness. Musculoskeletal:      Cervical back: Neck supple. Comments: No clubbing, cyanosis, or edema. Calves nontender, no cords. Skin:     General: Skin is warm and dry. Neurological:      Mental Status: She is alert and oriented to person, place, and time. Psychiatric:         Mood and Affect: Mood normal.                Diagnoses and all orders for this visit:    1. Routine physical examination  Comments:  Non-smoker, normal blood pressure, normal lipids. Sees GYN. Has had flu vaccine. 2. Dysuria  Comments:  with pyuria. After discussion, check culture, check for mycoplasma and Ureaplasma. Orders:  -     AMB POC URINALYSIS DIP STICK AUTO W/O MICRO    3. Current mild episode of major depressive disorder, unspecified whether recurrent ([address]  Comments:  controlled on Wellbutrin. 4. Numbness and tingling of left side of face  Comments:  Unclear etiology. Work-up unrevealing for cause. Patient thinks it may be due to stress, call if changes/worsens, would then refer to neurology    5. Urinary hesitancy  Comments: Without urge or stress incontinence.   If urine shows an abnormality and is treated, and hesitancy persists, let me know so we can check a pelvic u/s and PVR         Follow-up and Dispositions    Return in about 1 year (around 12/13/2023) for physical.              Kyrie Flynn MD

## 2022-12-13 NOTE — PROGRESS NOTES
Ruthy Michel presents today for No chief complaint on file. Is someone accompanying this pt? no    Is the patient using any DME equipment during [address]? no    Depression Screening:  3 most recent PHQ Screens 7/14/2022   Little interest or pleasure in doing things Not at all   Feeling down, depressed, irritable, or hopeless Not at all   Total Score PHQ 2 0       Learning Assessment:  Learning Assessment 10/24/2014   PRIMARY LEARNER Patient   HIGHEST LEVEL OF EDUCATION - PRIMARY LEARNER  -   BARRIERS PRIMARY LEARNER -   PRIMARY LANGUAGE ENGLISH   LEARNER PREFERENCE PRIMARY DEMONSTRATION   ANSWERED BY PATIENT   RELATIONSHIP SELF       Health Maintenance reviewed and discussed and ordered per Provider. Health Maintenance Due   Topic Date Due    Flu Vaccine (1) 08/01/2022    COVID-19 Vaccine (5 - Booster for Pfizer series) 09/07/2022   . Coordination of Care:  1. Have you been to the ER, urgent care clinic since your last visit? Hospitalized since your last visit? no    2. Have you seen or consulted any other health care providers outside of the [address] [place] [address] since your last visit? Include any pap smears or colon screening. no    3. For patients aged 39-70: Has the patient had a colonoscopy? NA - based on age     If the patient is female:    4. For patients aged 41-77: Has the patient had a mammogram within the past 2 years? Yes - no Care Gap present    5. For patients aged 21-65: Has the patient had a pap smear?  Yes - no Care Gap present

## 2022-12-14 LAB
BACTERIA SPEC CULT: NORMAL
SERVICE CMNT-IMP: NORMAL

## 2022-12-16 ENCOUNTER — TELEPHONE (OUTPATIENT)
Dept: INTERNAL MEDICINE CLINIC | Age: 42
End: 2022-12-16

## 2022-12-16 NOTE — TELEPHONE ENCOUNTER
Where are the results for the urine mycoplasma/ ureapasma?   If not done, ask her to leave a specimen next week

## 2023-01-05 ENCOUNTER — TELEPHONE (OUTPATIENT)
Dept: INTERNAL MEDICINE CLINIC | Age: 43
End: 2023-01-05

## 2023-01-05 NOTE — TELEPHONE ENCOUNTER
Urine from 12/13/2022 was normal, no mycoplasma or Ureaplasma. With the change in her urination, I recommend a pelvic ultrasound to make sure that she is not retaining urine in her bladder, and to make sure there are not any pelvic structures pressing on her bladder that could change her urinary habits. Where would she like me to order the ultrasound?

## 2023-01-10 NOTE — TELEPHONE ENCOUNTER
Left name and call back number. See message below. Urine from 12/13/2022 was normal, no mycoplasma or Ureaplasma. With the change in her urination, I recommend a pelvic ultrasound to make sure that she is not retaining urine in her bladder, and to make sure there are not any pelvic structures pressing on her bladder that could change her urinary habits. Where would she like me to order the ultrasound?

## 2023-01-18 DIAGNOSIS — R39.198 SUBJECTIVE CHANGE IN URINATION: Primary | ICD-10-CM

## 2023-02-01 DIAGNOSIS — R39.198 SUBJECTIVE CHANGE IN URINATION: ICD-10-CM

## 2023-02-09 ENCOUNTER — HOSPITAL ENCOUNTER (OUTPATIENT)
Dept: ULTRASOUND IMAGING | Age: 43
Discharge: HOME | End: 2023-02-09
Attending: INTERNAL MEDICINE
Payer: COMMERCIAL

## 2023-02-09 DIAGNOSIS — R39.198 SUBJECTIVE CHANGE IN URINATION: ICD-10-CM

## 2023-02-09 PROCEDURE — 93975 VASCULAR STUDY: CPT

## 2023-02-10 DIAGNOSIS — R39.198 SUBJECTIVE CHANGE IN URINATION: ICD-10-CM

## 2023-02-23 ENCOUNTER — HOSPITAL ENCOUNTER (OUTPATIENT)
Facility: HOSPITAL | Age: 43
Discharge: HOME | End: 2023-02-23
Payer: COMMERCIAL

## 2023-02-23 DIAGNOSIS — R39.198 SUBJECTIVE CHANGE IN URINATION: ICD-10-CM

## 2023-02-23 PROCEDURE — 76857 US EXAM PELVIC LIMITED: CPT

## 2023-02-24 ENCOUNTER — TELEPHONE (OUTPATIENT)
Age: 43
End: 2023-02-24

## 2023-02-24 DIAGNOSIS — R39.198 SUBJECTIVE CHANGE IN URINATION: Primary | ICD-10-CM

## 2023-02-24 NOTE — TELEPHONE ENCOUNTER
Pelvic ultrasound shows normal-appearing fluid in her uterus and cervix, where she had her. Around that time? Indira Mcarthur Also shows some normal follicles in her ovaries, nothing to explain her urinary symptoms. If she still having problems?

## 2023-02-24 NOTE — TELEPHONE ENCOUNTER
I sent referral to DR. Devon West, her office is in [place], mail patient a copy of the referral so she can call them if hasn't heard from them in a week

## 2023-02-24 NOTE — TELEPHONE ENCOUNTER
Patient stated that she had Pelvic ultrasound done around 2 pm yesterday (full bladder). She states that it happens mostly at night. Patient would also like to get the results from her [address] Transvaginal Feb 9 2023.

## 2023-02-24 NOTE — TELEPHONE ENCOUNTER
Referral to [facility] gynecology sent to pt via her my chart per her request.    Dr Fr[facility]  [address] [place] [address] [address] [place] [address]  Ph [phone]

## 2023-02-28 ENCOUNTER — TELEPHONE (OUTPATIENT)
Age: 43
End: 2023-02-28

## 2023-02-28 DIAGNOSIS — R93.41 ABNORMAL ULTRASOUND OF BLADDER: Primary | ICD-10-CM

## 2023-02-28 NOTE — TELEPHONE ENCOUNTER
Bladder ultrasound shows a bandlike structure posterior to the bladder on the right, radiologist comments could be a collapsed diverticulum. Discussed with patient, agrees to CT of pelvis with oral and IV contrast.  She will call urogynecology, will be out of town on spring break next week.

## 2023-03-31 ENCOUNTER — HOSPITAL ENCOUNTER (OUTPATIENT)
Facility: HOSPITAL | Age: 43
Discharge: HOME | End: 2023-03-31
Payer: COMMERCIAL

## 2023-03-31 DIAGNOSIS — R93.41 ABNORMAL ULTRASOUND OF BLADDER: ICD-10-CM

## 2023-03-31 PROCEDURE — 6360000004 HC RX CONTRAST MEDICATION: Performed by: INTERNAL MEDICINE

## 2023-03-31 PROCEDURE — 72193 CT PELVIS W/DYE: CPT

## 2023-03-31 RX ADMIN — IOPAMIDOL 100 ML: 612 INJECTION, SOLUTION INTRAVENOUS at 12:29

## 2023-06-22 ENCOUNTER — TELEPHONE (OUTPATIENT)
Age: 43
End: 2023-06-22

## 2023-06-22 DIAGNOSIS — A09 TRAVELER'S DIARRHEA: Primary | ICD-10-CM

## 2023-06-22 RX ORDER — SULFAMETHOXAZOLE AND TRIMETHOPRIM 800; 160 MG/1; MG/1
1 TABLET ORAL 2 TIMES DAILY
Qty: 14 TABLET | Refills: 0 | Status: SHIPPED | OUTPATIENT
Start: 2023-06-22 | End: 2023-06-29

## 2023-06-22 NOTE — TELEPHONE ENCOUNTER
----- Message from [facility]. Melanie Sat sent at 6/21/2023  3:40 PM EDT -----  Regarding: Travel to [address] [place] [address] Street: [phone]  Hello - we are leaving for [facility] next week. We are going to a really remote area so there won't be medical care near by. Last time I traveled internationally I got the norovirus or something horrible like that. I had to go to get an IV, etc.      I know we cannot drink the water in [place]. But, given my prior experience is there anything you can prescribe for me to have just in case I get bad diarrhia or have a super upset (throw up) stomach? I would only take the medicine if I really needed it.       Thank you,  Nicole Agrawal
Let her know that I sent in a sulfa antibiotic for her trip. It will treat travelers diarrhea and most UTIs. I also recommend taking Pepto-Bismol tablets with her, as they have some antibacterial effect against traveler's diarrhea.
self-care/home management/community/leisure

## 2023-09-08 ENCOUNTER — HOSPITAL ENCOUNTER (OUTPATIENT)
Facility: HOSPITAL | Age: 43
Setting detail: SPECIMEN
Discharge: HOME | End: 2023-09-11
Payer: COMMERCIAL

## 2023-09-08 PROCEDURE — 88305 TISSUE EXAM BY PATHOLOGIST: CPT

## 2023-09-08 PROCEDURE — 87624 HPV HI-RISK TYP POOLED RSLT: CPT

## 2023-09-08 PROCEDURE — 88175 CYTOPATH C/V AUTO FLUID REDO: CPT

## 2023-11-09 ENCOUNTER — HOSPITAL ENCOUNTER (OUTPATIENT)
Dept: WOMENS IMAGING | Facility: HOSPITAL | Age: 43
Discharge: HOME | End: 2023-11-09
Payer: COMMERCIAL

## 2023-11-09 DIAGNOSIS — Z12.31 VISIT FOR SCREENING MAMMOGRAM: ICD-10-CM

## 2023-11-09 PROCEDURE — 77063 BREAST TOMOSYNTHESIS BI: CPT

## 2024-01-15 ENCOUNTER — TELEPHONE (OUTPATIENT)
Age: 44
End: 2024-01-15

## 2024-01-15 DIAGNOSIS — Z00.00 ROUTINE PHYSICAL EXAMINATION: Primary | ICD-10-CM

## 2024-01-15 NOTE — TELEPHONE ENCOUNTER
----- Message from Rosangela Adams sent at 1/15/2024 10:47 AM EST -----  Subject: Message to Provider    QUESTIONS  Information for Provider? Pt needs an appt for labs. Please contact the pt  ---------------------------------------------------------------------------  --------------  CALL BACK INFO  [phone]; OK to leave message on voicemail  ---------------------------------------------------------------------------  --------------  SCRIPT ANSWERS  Relationship to Patient? Self

## 2024-01-16 ENCOUNTER — HOSPITAL ENCOUNTER (OUTPATIENT)
Facility: HOSPITAL | Age: 44
Setting detail: SPECIMEN
Discharge: HOME | End: 2024-01-19
Payer: COMMERCIAL

## 2024-01-16 DIAGNOSIS — Z00.00 ROUTINE PHYSICAL EXAMINATION: ICD-10-CM

## 2024-01-16 LAB
ALBUMIN SERPL-MCNC: 3.5 G/DL (ref 3.4–5)
ALBUMIN/GLOB SERPL: 1.1 (ref 0.8–1.7)
ALP SERPL-CCNC: 39 U/L (ref 45–117)
ALT SERPL-CCNC: 18 U/L (ref 13–56)
ANION GAP SERPL CALC-SCNC: 5 MMOL/L (ref 3–18)
AST SERPL-CCNC: 17 U/L (ref 10–38)
BASOPHILS # BLD: 0.1 K/UL (ref 0–0.1)
BASOPHILS NFR BLD: 1 % (ref 0–2)
BILIRUB SERPL-MCNC: 0.4 MG/DL (ref 0.2–1)
BUN SERPL-MCNC: 8 MG/DL (ref 7–18)
BUN/CREAT SERPL: 10 (ref 12–20)
CALCIUM SERPL-MCNC: 8.7 MG/DL (ref 8.5–10.1)
CHLORIDE SERPL-SCNC: 109 MMOL/L (ref 100–111)
CHOLEST SERPL-MCNC: 163 MG/DL
CO2 SERPL-SCNC: 25 MMOL/L (ref 21–32)
CREAT SERPL-MCNC: 0.78 MG/DL (ref 0.6–1.3)
DIFFERENTIAL METHOD BLD: NORMAL
EOSINOPHIL # BLD: 0.1 K/UL (ref 0–0.4)
EOSINOPHIL NFR BLD: 1 % (ref 0–5)
ERYTHROCYTE [DISTWIDTH] IN BLOOD BY AUTOMATED COUNT: 11.7 % (ref 11.6–14.5)
GLOBULIN SER CALC-MCNC: 3.2 G/DL (ref 2–4)
GLUCOSE SERPL-MCNC: 85 MG/DL (ref 74–99)
HCT VFR BLD AUTO: 41.6 % (ref 35–45)
HDLC SERPL-MCNC: 68 MG/DL (ref 40–60)
HDLC SERPL: 2.4 (ref 0–5)
HGB BLD-MCNC: 13.6 G/DL (ref 12–16)
IMM GRANULOCYTES # BLD AUTO: 0 K/UL (ref 0–0.04)
IMM GRANULOCYTES NFR BLD AUTO: 0 % (ref 0–0.5)
LDLC SERPL CALC-MCNC: 75.2 MG/DL (ref 0–100)
LIPID PANEL: ABNORMAL
LYMPHOCYTES # BLD: 2 K/UL (ref 0.9–3.6)
LYMPHOCYTES NFR BLD: 29 % (ref 21–52)
MCH RBC QN AUTO: 31 PG (ref 24–34)
MCHC RBC AUTO-ENTMCNC: 32.7 G/DL (ref 31–37)
MCV RBC AUTO: 94.8 FL (ref 78–100)
MONOCYTES # BLD: 0.4 K/UL (ref 0.05–1.2)
MONOCYTES NFR BLD: 6 % (ref 3–10)
NEUTS SEG # BLD: 4.5 K/UL (ref 1.8–8)
NEUTS SEG NFR BLD: 63 % (ref 40–73)
NRBC # BLD: 0 K/UL (ref 0–0.01)
NRBC BLD-RTO: 0 PER 100 WBC
PLATELET # BLD AUTO: 194 K/UL (ref 135–420)
PMV BLD AUTO: 10.9 FL (ref 9.2–11.8)
POTASSIUM SERPL-SCNC: 3.9 MMOL/L (ref 3.5–5.5)
PROT SERPL-MCNC: 6.7 G/DL (ref 6.4–8.2)
RBC # BLD AUTO: 4.39 M/UL (ref 4.2–5.3)
SODIUM SERPL-SCNC: 139 MMOL/L (ref 136–145)
TRIGL SERPL-MCNC: 99 MG/DL
VLDLC SERPL CALC-MCNC: 19.8 MG/DL
WBC # BLD AUTO: 7.2 K/UL (ref 4.6–13.2)

## 2024-01-16 PROCEDURE — 85025 COMPLETE CBC W/AUTO DIFF WBC: CPT

## 2024-01-16 PROCEDURE — 36415 COLL VENOUS BLD VENIPUNCTURE: CPT

## 2024-01-16 PROCEDURE — 80053 COMPREHEN METABOLIC PANEL: CPT

## 2024-01-16 PROCEDURE — 80061 LIPID PANEL: CPT

## 2024-01-18 ENCOUNTER — OFFICE VISIT (OUTPATIENT)
Age: 44
End: 2024-01-18
Payer: COMMERCIAL

## 2024-01-18 VITALS
HEART RATE: 70 BPM | TEMPERATURE: 97.4 F | SYSTOLIC BLOOD PRESSURE: 119 MMHG | HEIGHT: 62 IN | OXYGEN SATURATION: 99 % | DIASTOLIC BLOOD PRESSURE: 75 MMHG | WEIGHT: 123.4 LBS | RESPIRATION RATE: 17 BRPM | BODY MASS INDEX: 22.71 KG/M2

## 2024-01-18 DIAGNOSIS — R39.198 SUBJECTIVE CHANGE IN URINATION: ICD-10-CM

## 2024-01-18 DIAGNOSIS — Z00.00 ROUTINE PHYSICAL EXAMINATION: Primary | ICD-10-CM

## 2024-01-18 PROCEDURE — 99396 PREV VISIT EST AGE 40-64: CPT | Performed by: INTERNAL MEDICINE

## 2024-01-18 SDOH — ECONOMIC STABILITY: INCOME INSECURITY: HOW HARD IS IT FOR YOU TO PAY FOR THE VERY BASICS LIKE FOOD, HOUSING, MEDICAL CARE, AND HEATING?: NOT HARD AT ALL

## 2024-01-18 SDOH — ECONOMIC STABILITY: FOOD INSECURITY: WITHIN THE PAST 12 MONTHS, YOU WORRIED THAT YOUR FOOD WOULD RUN OUT BEFORE YOU GOT MONEY TO BUY MORE.: NEVER TRUE

## 2024-01-18 SDOH — ECONOMIC STABILITY: HOUSING INSECURITY
IN THE LAST 12 MONTHS, WAS THERE A TIME WHEN YOU DID NOT HAVE A STEADY PLACE TO SLEEP OR SLEPT IN A SHELTER (INCLUDING NOW)?: NO

## 2024-01-18 ASSESSMENT — ENCOUNTER SYMPTOMS
BLOOD IN STOOL: 0
SHORTNESS OF BREATH: 0

## 2024-01-18 NOTE — PROGRESS NOTES
Sruthi Romo presents today for   Chief Complaint   Patient presents with    Annual Exam                 1. \"Have you been to the ER, urgent care clinic since your last visit?  Hospitalized since your last visit?\" no    2. \"Have you seen or consulted any other health care providers outside of the [facility] since your last visit?\" no     3. For patients aged 45-75: Has the patient had a colonoscopy / FIT/ Cologuard? No      If the patient is female:    4. For patients aged 40-74: Has the patient had a mammogram within the past 2 years? Yes - no Care Gap present      5. For patients aged 21-65: Has the patient had a pap smear? Yes - no Care Gap present

## 2024-01-18 NOTE — PROGRESS NOTES
HPI     Sruthi Romo is here for routine physical exam.  She is being treated for depression by another provider, feels her medications are working fairly well.  She saw Dr. Robins for her changes in urinary habits, states she was referred for pelvic floor therapy and has found it significantly helpful.  A cystoscopy was attempted but will have to be done under mild sedation.  No new family history reported.    Past Medical History:   Diagnosis Date    Anxiety and depression     post partum depression    Fetal growth restriction     Rubella non-immune status, antepartum         Past Surgical History:   Procedure Laterality Date     SECTION       SECTION      CYST REMOVAL      right shoulder        Current Outpatient Medications   Medication Sig Dispense Refill    buPROPion (WELLBUTRIN SR) 200 MG extended release tablet Take 200 mg by mouth 2 times daily      norethindrone-ethinyl estradiol (JUNEL FE 1/20) 1-20 MG-MCG per tablet ceived the following from [facility]: Outside name: JUNEL FE 1/20, 28, 1 mg-20 mcg (21)/75 mg (7) tab      sertraline (ZOLOFT) 100 MG tablet Take 125 mg by mouth daily       No current facility-administered medications for this visit.        Allergies   Allergen Reactions    Nitrofurantoin Shortness Of Breath        Social History     Socioeconomic History    Marital status:      Spouse name: Not on file    Number of children: Not on file    Years of education: Not on file    Highest education level: Not on file   Occupational History    Not on file   Tobacco Use    Smoking status: Never    Smokeless tobacco: Never   Substance and Sexual Activity    Alcohol use: Yes     Alcohol/week: 1.0 standard drink of alcohol    Drug use: No    Sexual activity: Not on file   Other Topics Concern    Not on file   Social History Narrative    Not on file     Social Determinants of Health     Financial Resource Strain: Low Risk  (2024)    Overall Financial

## 2024-01-22 ENCOUNTER — TELEPHONE (OUTPATIENT)
Age: 44
End: 2024-01-22

## 2024-02-13 ENCOUNTER — TELEPHONE (OUTPATIENT)
Age: 44
End: 2024-02-13

## 2024-02-13 DIAGNOSIS — Z12.12 SCREENING FOR COLORECTAL CANCER: Primary | ICD-10-CM

## 2024-02-13 DIAGNOSIS — Z12.11 SCREENING FOR COLORECTAL CANCER: Primary | ICD-10-CM

## 2024-02-13 NOTE — TELEPHONE ENCOUNTER
Regarding: Colonoscopy  Contact: [phone]  Apologies for the delay in responding.  No, I don't have know of any doctors who perform this procedure.

## 2024-02-13 NOTE — TELEPHONE ENCOUNTER
Referral is being sent to GI group in the [facility] area, please get her a copy of the referral, or at least their office number so she can call if has not heard from them in a week

## 2024-10-07 ENCOUNTER — TELEPHONE (OUTPATIENT)
Facility: CLINIC | Age: 44
End: 2024-10-07

## 2024-10-07 NOTE — TELEPHONE ENCOUNTER
----- Message from Kenyona Soniqplay sent at 10/7/2024 11:30 AM EDT -----  Regarding: [facility] Appointment Request  [facility] Appointment Request     Patient needs appointment for [facility] Appointment Type: Annual Visit.    Patient Requested Dates(s): January 18 2025 or after the 18th  Patient Requested Time: Morning is better but either time is fine  Provider Name: Dr. Quinn, Francoise COLLINS MD    Reason for Appointment Request: Established Patient - No appointments available during search  --------------------------------------------------------------------------------------------------------------------------    Relationship to Patient: Self     Call Back Information: OK to leave message on voicemail  Preferred Call Back Number: Phone number : [phone] (home)

## 2024-12-16 ENCOUNTER — TELEPHONE (OUTPATIENT)
Facility: CLINIC | Age: 44
End: 2024-12-16

## 2024-12-16 NOTE — TELEPHONE ENCOUNTER
Patient was notified of Dr. Quinn's senior care. Patient aware that future appointments with Dr. Quinn have been cancelled and [facility] will provide patient the needed refills on medication for 90 days.   Patient was offered the phone numbers of offices in [facility] taking new patients, [facility] [phone] and [facility] [phone].  All questions were answered and patient voiced understanding.